# Patient Record
Sex: FEMALE | Race: WHITE | Employment: FULL TIME | URBAN - METROPOLITAN AREA
[De-identification: names, ages, dates, MRNs, and addresses within clinical notes are randomized per-mention and may not be internally consistent; named-entity substitution may affect disease eponyms.]

---

## 2017-09-26 PROCEDURE — 87510 GARDNER VAG DNA DIR PROBE: CPT | Performed by: INTERNAL MEDICINE

## 2017-09-26 PROCEDURE — 87491 CHLMYD TRACH DNA AMP PROBE: CPT | Performed by: INTERNAL MEDICINE

## 2017-09-26 PROCEDURE — 87591 N.GONORRHOEAE DNA AMP PROB: CPT | Performed by: INTERNAL MEDICINE

## 2017-09-26 PROCEDURE — 87660 TRICHOMONAS VAGIN DIR PROBE: CPT | Performed by: INTERNAL MEDICINE

## 2017-09-26 PROCEDURE — 87624 HPV HI-RISK TYP POOLED RSLT: CPT | Performed by: INTERNAL MEDICINE

## 2017-09-26 PROCEDURE — 87480 CANDIDA DNA DIR PROBE: CPT | Performed by: INTERNAL MEDICINE

## 2017-09-26 PROCEDURE — 88175 CYTOPATH C/V AUTO FLUID REDO: CPT | Performed by: INTERNAL MEDICINE

## 2017-10-07 PROCEDURE — 81001 URINALYSIS AUTO W/SCOPE: CPT | Performed by: INTERNAL MEDICINE

## 2019-01-31 PROCEDURE — 81001 URINALYSIS AUTO W/SCOPE: CPT | Performed by: INTERNAL MEDICINE

## 2019-01-31 PROCEDURE — 87086 URINE CULTURE/COLONY COUNT: CPT | Performed by: INTERNAL MEDICINE

## 2020-11-02 ENCOUNTER — HOSPITAL ENCOUNTER (OUTPATIENT)
Age: 50
Discharge: HOME | End: 2020-11-02
Payer: COMMERCIAL

## 2020-11-02 VITALS
SYSTOLIC BLOOD PRESSURE: 136 MMHG | TEMPERATURE: 98 F | HEART RATE: 86 BPM | OXYGEN SATURATION: 99 % | RESPIRATION RATE: 18 BRPM | DIASTOLIC BLOOD PRESSURE: 84 MMHG

## 2020-11-02 DIAGNOSIS — Z20.822 ENCOUNTER FOR LABORATORY TESTING FOR COVID-19 VIRUS: Primary | ICD-10-CM

## 2020-11-02 PROCEDURE — U0003 INFECTIOUS AGENT DETECTION BY NUCLEIC ACID (DNA OR RNA); SEVERE ACUTE RESPIRATORY SYNDROME CORONAVIRUS 2 (SARS-COV-2) (CORONAVIRUS DISEASE [COVID-19]), AMPLIFIED PROBE TECHNIQUE, MAKING USE OF HIGH THROUGHPUT TECHNOLOGIES AS DESCRIBED BY CMS-2020-01-R: HCPCS | Performed by: EMERGENCY MEDICINE

## 2020-11-02 PROCEDURE — 99213 OFFICE O/P EST LOW 20 MIN: CPT | Performed by: EMERGENCY MEDICINE

## 2020-11-02 NOTE — ED PROVIDER NOTES
Patient Seen in: [facility]      History   Patient presents with:  Testing    Stated Complaint: Covid testing    Zuri Dominguez is a 48year old  female here for covid testing after daughter and  had sx of sore throat.  Mild congesti Normocephalic. Right Ear: Tympanic membrane, ear canal and external ear normal.      Left Ear: Tympanic membrane, ear canal and external ear normal. There is no impacted cerumen. Nose: Nose normal. No rhinorrhea. Mouth/Throat:      Mouth:  Mu our current criteria to test for COVID-19. Instructions given on self-isolation/quarantine and anticipatory guidance. Rechecked patient. Updated patient  on all findings and plan, who verbalized understanding and agreement with the plan.  All questions

## 2021-01-05 ENCOUNTER — HOSPITAL ENCOUNTER (OUTPATIENT)
Age: 51
Discharge: HOME | End: 2021-01-05
Payer: COMMERCIAL

## 2021-01-05 VITALS
TEMPERATURE: 98 F | SYSTOLIC BLOOD PRESSURE: 124 MMHG | RESPIRATION RATE: 18 BRPM | HEART RATE: 77 BPM | OXYGEN SATURATION: 98 % | DIASTOLIC BLOOD PRESSURE: 82 MMHG

## 2021-01-05 DIAGNOSIS — Z11.52 ENCOUNTER FOR SCREENING FOR COVID-19: Primary | ICD-10-CM

## 2021-01-05 PROCEDURE — 99213 OFFICE O/P EST LOW 20 MIN: CPT | Performed by: NURSE PRACTITIONER

## 2021-01-05 NOTE — ED PROVIDER NOTES
Patient Seen in: [facility]      History   Patient presents with:  Testing    Stated Complaint: Covid testing    HPI/Subjective:   HPI    This is a well appearing 48year old female who presents for a covid exposure.   Patient states her neph normal.      Mouth/Throat:      Mouth: Mucous membranes are moist.      Pharynx: Oropharynx is clear. Uvula midline. Eyes:      General: Lids are normal.      Extraocular Movements: Extraocular movements intact.       Conjunctiva/sclera: Conjunctivae norm

## 2021-01-09 LAB — SARS-COV-2 BY PCR: NOT DETECTED

## 2021-01-24 ENCOUNTER — HOSPITAL ENCOUNTER (OUTPATIENT)
Age: 51
Discharge: HOME | End: 2021-01-24
Attending: PHYSICIAN ASSISTANT
Payer: COMMERCIAL

## 2021-01-24 VITALS
OXYGEN SATURATION: 99 % | RESPIRATION RATE: 18 BRPM | HEART RATE: 88 BPM | TEMPERATURE: 98 F | SYSTOLIC BLOOD PRESSURE: 132 MMHG | DIASTOLIC BLOOD PRESSURE: 88 MMHG

## 2021-01-24 DIAGNOSIS — J02.0 ACUTE STREPTOCOCCAL PHARYNGITIS: Primary | ICD-10-CM

## 2021-01-24 DIAGNOSIS — Z20.822 ENCOUNTER FOR LABORATORY TESTING FOR COVID-19 VIRUS: ICD-10-CM

## 2021-01-24 LAB
S PYO AG THROAT QL: POSITIVE
SARS-COV-2 RNA RESP QL NAA+PROBE: NOT DETECTED

## 2021-01-24 PROCEDURE — 87880 STREP A ASSAY W/OPTIC: CPT | Performed by: PHYSICIAN ASSISTANT

## 2021-01-24 PROCEDURE — 99202 OFFICE O/P NEW SF 15 MIN: CPT | Performed by: PHYSICIAN ASSISTANT

## 2021-01-24 RX ORDER — IBUPROFEN 600 MG/1
TABLET ORAL
Qty: 20 TABLET | Refills: 0 | Status: SHIPPED | OUTPATIENT
Start: 2021-01-24

## 2021-01-24 RX ORDER — AZITHROMYCIN 250 MG/1
TABLET, FILM COATED ORAL
Qty: 1 PACKAGE | Refills: 0 | Status: SHIPPED | OUTPATIENT
Start: 2021-01-24 | End: 2021-01-29

## 2021-01-24 NOTE — ED INITIAL ASSESSMENT (HPI)
Pt here with complains of sore throat and headache that has been going on for 3 days , pt also states she has had some congestion and fatigue as well

## 2021-01-24 NOTE — ED PROVIDER NOTES
Patient Seen in: [facility]    History   Patient presents with:  Sore Throat    Stated Complaint: HEAD ACH SORE THROAT    HPI      63-year-old female presents with chief complaint of sore throat. Onset 5 days ago.   Patient reports associate Onset   • Cancer Father         bladder Ca   • Heart Disorder Father         s/p CABG in 42's.   living age 70 in 2016   • Heart Disease Father    • Hypertension Father    • Other (Other) Mother         hypothyroid, RA   • Heart Disorder Maternal Grandmothe within normal size limits bilaterally. No tonsillar exudates. Uvula midline. No trismus. No drooling. TMs within normal limits bilaterally. Mucous membranes moist.  Neck: The neck is supple. There is no evidence of JVD. No meningeal signs.   Chest: doctor as instructed. The patient verbalized understanding of the discharge instructions and plan.     Disposition and Plan     Clinical Impression:  Acute streptococcal pharyngitis  (primary encounter diagnosis)  Encounter for laboratory testing for COVID-

## 2022-04-28 ENCOUNTER — HOSPITAL ENCOUNTER (EMERGENCY)
Facility: HOSPITAL | Age: 52
Discharge: HOME | End: 2022-04-28
Attending: EMERGENCY MEDICINE
Payer: COMMERCIAL

## 2022-04-28 VITALS
RESPIRATION RATE: 16 BRPM | WEIGHT: 157 LBS | BODY MASS INDEX: 28.89 KG/M2 | HEART RATE: 81 BPM | OXYGEN SATURATION: 100 % | SYSTOLIC BLOOD PRESSURE: 146 MMHG | HEIGHT: 62 IN | DIASTOLIC BLOOD PRESSURE: 89 MMHG | TEMPERATURE: 99 F

## 2022-04-28 DIAGNOSIS — S01.81XA FACIAL LACERATION, INITIAL ENCOUNTER: Primary | ICD-10-CM

## 2022-04-28 PROCEDURE — 90471 IMMUNIZATION ADMIN: CPT

## 2022-04-28 PROCEDURE — 12013 RPR F/E/E/N/L/M 2.6-5.0 CM: CPT

## 2022-04-28 PROCEDURE — 99283 EMERGENCY DEPT VISIT LOW MDM: CPT

## 2022-04-28 NOTE — ED INITIAL ASSESSMENT (HPI)
Hit in the face with a soccer ball around 1320. Denies LOC, N/V.     +Headache. Laceration to bridge of nose.

## 2022-09-15 NOTE — PROGRESS NOTES
Called patient for her scheduled telephone colon screening. Per protocol scheduled pt for OV due to active sxs. Date, time, provider and location discussed over the phone.

## 2022-10-12 NOTE — TELEPHONE ENCOUNTER
From: Nelson Henriquez  To: Lyla Ricketts MD  Sent: 10/12/2022 1:40 PM CDT  Subject: Carollysimran Sic afternoon, I know at my appointment we took lexipro off my list of medications because I was not using it. Unfortunately since I started back at school in late august I have been in great need of it. I have been taking what was left over and I would like to revisit getting refills.   Thank you, Regino Lopes

## 2022-10-19 NOTE — TELEPHONE ENCOUNTER
Scheduled for: Colonoscopy 36999/EGD [address]  Provider Name: Dr Logan Jade  Date: Alli Terry 12/20/2022  [facility]   Sedation: MAC  Time:  11 am, (pt is aware that [facility] beds at the [facility] will call the day before to confirm arrival time)  Prep: split trilyte  Meds/Allergies Reconciled?: reviewed by provider   Diagnosis with codes:  crc screening Z12.11, gerd K21.9, throat pain R07.0  Was patient informed to call insurance with codes (Y/N): Yes   Referral sent?:  Yes  [facility] or [id number] notified?: Electronic case request was sent to [facility] via [employer]. Medication Orders: Pt is aware to NOT take iron pills, herbal meds and diet supplements for 7 days before exam. Also to NOT take any form of alcohol, recreational drugs and any forms of ED meds 24 hours before exam.     Misc Orders: Patient was informed that they will need a COVID 19 test prior to their procedure. Patient verbally understood & will await a phone call from [facility] to schedule. Further instructions given by staff:   Instructions given and pt verbalized understanding

## 2022-11-28 NOTE — ED INITIAL ASSESSMENT (HPI)
Pt states something is going through he house. Pt states having a cough and fever. Pt states having a HA. Pt states cough is very mild.

## 2022-12-21 NOTE — TELEPHONE ENCOUNTER
3 Year colonoscopy recall entered. Health maintenance updated. Colonoscopy done on 12/20/22 and next due on 12/20/25.

## 2022-12-21 NOTE — TELEPHONE ENCOUNTER
----- Message from Lilibeth Hall MD sent at 12/21/2022 12:05 PM CST -----  GI staff: please place recall for colonoscopy in 3 years

## 2023-01-25 NOTE — PATIENT INSTRUCTIONS
Obtain preoperative testing. Plan outpatient hemorrhoidectomy at [facility].    Same-day surgery will call the day before with instructions.   Avoid aspirin and NSAIDs for 5 days prior to surgery

## 2023-02-13 NOTE — ED INITIAL ASSESSMENT (HPI)
Pt here with sore throat since friday and noticed yesterday that uvula is slightly swollen and painful to swallow. denies any respiratory distress.

## 2023-03-16 NOTE — INTERVAL H&P NOTE
Pre-op Diagnosis: Hemorrhoids, unspecified hemorrhoid type [K64.9]    The above referenced H&P was reviewed by Pallavi Gomes MD on 3/16/2023, the patient was examined and no significant changes have occurred in the patient's condition since the H&P was performed. I discussed with the patient and/or legal representative the potential benefits, risks and side effects of this procedure; the likelihood of the patient achieving goals; and potential problems that might occur during recuperation. I discussed reasonable alternatives to the procedure, including risks, benefits and side effects related to the alternatives and risks related to not receiving this procedure. We will proceed with procedure as planned.

## 2023-03-16 NOTE — ANESTHESIA PROCEDURE NOTES
Airway  Date/Time: 3/16/2023 12:48 PM  Urgency: Elective    Airway not difficult    General Information and Staff    Patient location during procedure: OR  Anesthesiologist: Shelly Lynch MD  Performed: anesthesiologist   Performed by: Shelly Lynch MD  Authorized by: Shelly Lynch MD      Indications and Patient Condition  Indications for airway management: anesthesia  Spontaneous Ventilation: absent  Sedation level: deep  Preoxygenated: yes  Patient position: sniffing  Mask difficulty assessment: 1 - vent by mask  Planned trial extubation    Final Airway Details  Final airway type: endotracheal airway      Successful airway: ETT  Cuffed: yes   Successful intubation technique: direct laryngoscopy  Facilitating devices/methods: cricoid pressure and intubating stylet  Endotracheal tube insertion site: oral  Blade: Klaudia  Blade size: #3  ETT size (mm): 7.0    Cormack-Lehane Classification: grade I - full view of glottis  Placement verified by: chest auscultation and capnometry   Measured from: lips  ETT to lips (cm): 22  Number of attempts at approach: 1  Number of other approaches attempted: 0    Additional Comments  Intubated easily first attempt. No dental or soft tissue damage.  No signs of aspiration

## 2023-03-17 NOTE — OPERATIVE REPORT
[facility]    PATIENT'S NAME: JOSELIN Best   ATTENDING PHYSICIAN: Adrianna Ruano MD   OPERATING PHYSICIAN: Adrianna Ruano MD   PATIENT ACCOUNT#:   [de-identified]    LOCATION:  [address]  MEDICAL RECORD #:   [id number]       YOB: 1970  ADMISSION DATE:       03/16/2023      OPERATION DATE:  03/16/2023    OPERATIVE REPORT    PREOPERATIVE DIAGNOSIS:  Complex hemorrhoids. POSTOPERATIVE DIAGNOSIS:  Complex hemorrhoids. PROCEDURE:  Complex hemorrhoidectomy x1, excision thrombosed hemorrhoid x1. ASSISTANT:  TREVOR Madsen. ESTIMATED BLOOD LOSS:  2 mL. COMPLICATIONS:  None. ANESTHESIA:  General.    DISPOSITION:  To recovery, tolerated well. INDICATIONS:  The patient is a pleasant 54-year-old with the above complaint. Consent obtained. OPERATIVE TECHNIQUE:  She was taken to surgery, placed in the prone position, prepped and draped in the usual sterile fashion. Prolapse test is performed. There is a large fibroepithelial lesion most likely from a chronic prolapsed hemorrhoid at 12 o'clock. A complex hemorrhoidectomy is performed using a 15 blade scalpel. Hemorrhoid dissected from the sphincter mechanism. Hemorrhoidectomy completed using Harmonic Scalpel. Mucosa approximated using running 2-0 chromic as is the anoderm. Marcaine infiltrated for local block. Just adjacent to this is a small thrombosed hemorrhoid, which is excised using LigaSure. No suturing is required. Sphincter block performed with Marcaine. Dibucaine, Gelfoam were placed and sterile dressing applied. She tolerated this well.      Dictated By Adrianna Ruano MD  d: 03/16/2023 13:17:55  t: 03/16/2023 16:45:57  Job [id number]  /    cc: Saadia Zamora MD

## 2023-03-20 NOTE — TELEPHONE ENCOUNTER
Per pt needs a return to work note with return date 4/3. Per pt asking to please upload into [address]  Thank you

## 2023-03-20 NOTE — TELEPHONE ENCOUNTER
RC at 3-. Patient request a letter to [employer].  MD ANITA  APPROVED. Letter written and emailed to patient. Patient informed.    BRANDON

## 2023-04-04 NOTE — PROGRESS NOTES
Postoperative Patient Follow-up      4/4/2023    HPI  Patient presents with:  Post-Op: PO hemorrhoidectomy 3/16/23      Toyin Echeverria is a 46year old female post op  Hemorrhoidectomy.     Exam  Normal post op changes      Assessment/Plan  Assessment   Post-operative state  (primary encounter diagnosis)    [facility]  Fu for problems         Anderson Daly MD

## 2023-07-05 NOTE — H&P
HPI:    Kenneth House is a 46year old female presents to clinic with menopausal concerns. Last menstrual cycle was about 2 years back. Since then has suffered from hot flashes, vaginal dryness, weight gain. Would like to discuss hormone replacement therapy  HISTORY:  Past Medical History:   Diagnosis Date    Ankle fracture     R    ANXIETY     Back problem     DEPRESSION     Depression     Esophageal reflux     HEADACHES     sinus    HEMORRHOIDS     HYPERLIPIDEMIA     mild    Osteoarthritis     OTHER DISEASES     fx'd 5th finger    Raynaud disease     Rheumatoid arthritis (HCC)     Visual impairment     Glasses      Past Surgical History:   Procedure Laterality Date      4-8-08    x2    D & C  6-10-09    SAB/    OTHER SURGICAL HISTORY      lower lid lac repair L as child    TUBAL LIGATION        Family History   Problem Relation Age of Onset    Cancer Father         bladder Ca    Heart Disorder Father         s/p CABG in s.   living age 70 in 2016    Heart Disease Father     Hypertension Father     Other (Other) Mother         hypothyroid, RA    Thyroid disease Mother     Heart Disorder Maternal Grandmother         MI CABG    Other (Other) Maternal Grandmother     Heart Disorder Maternal Grandfather     Other (Other) Maternal Grandfather         CVA, alzheimers- dx approx age [de-identified].  d alziherimers    Cancer Paternal Grandmother         pancreatic Ca    Heart Disorder Paternal Grandfather         CAD    Other (Other) Brother         ADD    Pulmonary Disease Brother         asthma    Cancer Other         bladder    Other (Other) Other         No Fhx colon, breast or ovarian CA.  no Fhx melanoma      Social History:   Social History     Socioeconomic History    Marital status:    Tobacco Use    Smoking status: Never    Smokeless tobacco: Never   Vaping Use    Vaping Use: Never used   Substance and Sexual Activity    Alcohol use: Yes     Comment: socially, 1x/week    Drug use: Never    Sexual activity: Yes     Partners: Male     Birth control/protection: Tubal Ligation     Comment: , together since 2007   Social History Narrative    Subbing        2 and 2 step kids--5, 7, 13, 14        Diet: well balanced, enough veggies, combo    Exercise: 3 times per week    Sleep: ok, hard to stay asleep    Stress: high, not good support        Periods: regular    Last pap: 2014--normal    Mammo: 2014        Sees Mary Jones        Never had c-scope        2016:  Lives with , kids and step kids. Works as         Medications (Active prior to today's visit):  Current Outpatient Medications   Medication Sig Dispense Refill    Conj Estrog-Medroxyprogest Ace 0.3-1.5 MG Oral Tab Take 1 tablet by mouth daily. 90 tablet 0    escitalopram 20 MG Oral Tab Take 1 tablet (20 mg total) by mouth daily. 90 tablet 3    ibuprofen 600 MG Oral Tab Take 1 tablet (600 mg total) by mouth every 6 hours with food 20 tablet 0    Multiple Vitamin (DAILY MULTIVITAMIN OR) Take  by mouth. HYDROcodone-acetaminophen 5-325 MG Oral Tab Take 1 tablet by mouth every 6 (six) hours as needed for Pain. (Patient not taking: Reported on 7/5/2023) 15 tablet 0    docusate sodium 100 MG Oral Cap Take 1 capsule (100 mg total) by mouth 2 (two) times daily. (Patient not taking: Reported on 7/5/2023) 20 capsule 1    acetaminophen 500 MG Oral Tab Take 1 tablet (500 mg total) by mouth as needed for Pain. (Patient not taking: Reported on 7/5/2023)      FLUTICASONE PROPIONATE 50 MCG/ACT Nasal Suspension SPRAY 2 PUFFS IN EACH NOSTRIL DAILY (Patient not taking: Reported on 7/5/2023) 48 g 0       Allergies:    Amoxicillin             RASH      Depression Screening (PHQ-2/PHQ-9): Over the LAST 2 WEEKS                         ROS:   Review of Systems   All other systems reviewed and are negative.       PHYSICAL EXAM:      07/05/23  1254   BP: 114/79   BP Location: Right arm   Patient Position: Sitting   Cuff Size: large   Pulse: 67   Resp: 18   SpO2: 98%   Weight: 161 lb (73 kg)   Height: 5' 2\" (1.575 m)     Physical Exam  Constitutional:       General: She is not in acute distress. Cardiovascular:      Rate and Rhythm: Normal rate. Pulmonary:      Effort: No respiratory distress. Neurological:      Mental Status: She is alert. Mental status is at baseline. Psychiatric:         Mood and Affect: Mood normal.         ASSESSMENT/PLAN:   (Z78.0) Menopause  (primary encounter diagnosis)  (R23.2) Hot flashes  (N89.8) Vaginal dryness  Plan:   - options for treatment discussed. Patient would like to try HRT. Counseled on adverse effects including increased risk of blood clots, strokes, breast cancer, endometrial cancer, elevated blood pressure. Balanced diet, regular physical activity encouraged. Prempro to pharmacy. Side effects/instructions discussed follow-up in 6 to 8 weeks or sooner if needed.    (Z12.31) Visit for screening mammogram  Plan: [facility] 2D+3D SCREENING BILAT         (CPT=77067/91127)         Responsible party/patient verbalized understanding of information discussed. No barriers to learning observed. Orders This Visit:  No orders of the defined types were placed in this encounter. Meds This Visit:  Requested Prescriptions     Signed Prescriptions Disp Refills    Conj Estrog-Medroxyprogest Ace 0.3-1.5 MG Oral Tab 90 tablet 0     Sig: Take 1 tablet by mouth daily. Imaging & Referrals:  [facility] 2D+3D SCREENING BILAT (CPT=77067/60230)       The 21st Century cures Act makes medical notes like these available to patients in the interest of transparency. However, be advised that this is a medical document. It is intended as peer to peer communication. It is written in medical language and may contain abbreviations or verbiage that are unfamiliar. It may appear blunt or direct.   Medical documents are intended to carry relevant information, facts as evident, and the clinical opinion of the practitioner. This note was created by [employer] voice recognition. Errors in content may be related to improper recognition by the system; efforts to review and correct have been done but errors may still exist. Please contact me with any questions.        7/5/2023  Daria Olivarez MD

## 2023-07-27 NOTE — TELEPHONE ENCOUNTER
From: Marietta Diaz  To: Daria Olivarez MD  Sent: 7/27/2023 9:26 AM CDT  Subject: hormone update    It's been three weeks. I don't really feel any different. Still getting hot flashes. The dryness is maybe a little better. BUT, I've gained 5 pounds with no change in my diet or exercise.

## 2024-10-15 NOTE — PROGRESS NOTES
HPI:    Toyin Barajas is a 54 year old female presents for video visit with concerns regarding abdominal pain. For the past several months, patient has felt that regardless of what she eats, she develops abdominal pain, bloating, acid reflux. Normal appetite, eats bland foods. Has cutout spicy/fried foods. Atleast 1 BM a day, normal urination.   Over the past 2 weeks, she feels that pain is localized to her right upper quadrant. Sharp, intermittent.       HISTORY:  Past Medical History:    Ankle fracture    R    ANXIETY    Back problem    DEPRESSION    Depression    Esophageal reflux    HEADACHES    sinus    HEMORRHOIDS    HYPERLIPIDEMIA    mild    Osteoarthritis    OTHER DISEASES    fx'd 5th finger    Raynaud disease    Rheumatoid arthritis (HCC)    Visual impairment    Glasses      Past Surgical History:   Procedure Laterality Date      4-8-08    x2    D & c  6-10-09    SAB/    Other surgical history      lower lid lac repair L as child    Tubal ligation        Family History   Problem Relation Age of Onset    Other (Other) Mother         hypothyroid, RA    Thyroid disease Mother     Cancer Father         bladder Ca    Heart Disorder Father         s/p CABG in 40's.  living age 71 in 2016    Heart Disease Father     Hypertension Father     Other (Other) Brother         ADD    Pulmonary Disease Brother         asthma    Heart Disorder Maternal Grandmother         MI CABG    Other (Other) Maternal Grandmother     Heart Disorder Maternal Grandfather     Other (Other) Maternal Grandfather         CVA, alzheimers- dx approx age 80.  d alziherimers    Pancreatic Cancer Paternal Grandmother 78    Cancer Paternal Grandmother         pancreatic Ca    Heart Disorder Paternal Grandfather         CAD    Cancer Other         bladder    Other (Other) Other         No Fhx colon, breast or ovarian CA.  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  Works as         Medications (Active prior to today's visit):  Current Outpatient Medications   Medication Sig Dispense Refill    estradiol 0.1 MG/GM Vaginal Cream Please administer 2g daily for 1 week, then 1g daily for the next week and then 1g 3 times a week thereafter 1 each 3    escitalopram 20 MG Oral Tab Take 1 tablet (20 mg total) by mouth daily. 90 tablet 3    HYDROcodone-acetaminophen 5-325 MG Oral Tab Take 1 tablet by mouth every 6 (six) hours as needed for Pain. (Patient not taking: Reported on 7/5/2023) 15 tablet 0    docusate sodium 100 MG Oral Cap Take 1 capsule (100 mg total) by mouth 2 (two) times daily. (Patient not taking: Reported on 7/5/2023) 20 capsule 1    acetaminophen 500 MG Oral Tab Take 1 tablet (500 mg total) by mouth as needed for Pain. (Patient not taking: Reported on 7/5/2023)      ibuprofen 600 MG Oral Tab Take 1 tablet (600 mg total) by mouth every 6 hours with food 20 tablet 0    FLUTICASONE PROPIONATE 50 MCG/ACT Nasal Suspension SPRAY 2 PUFFS IN EACH NOSTRIL DAILY (Patient not taking: Reported on 7/5/2023) 48 g 0    Multiple Vitamin (DAILY MULTIVITAMIN OR) Take  by mouth.         Allergies:  Allergies[1]      Depression Screening  (PHQ-2/PHQ-9): Over the LAST 2 WEEKS                         ROS:   Review of Systems   All other systems reviewed and are negative.      PHYSICAL EXAM:   There were no vitals filed for this visit.  Physical Exam  Constitutional:       General: She is not in acute distress.  Pulmonary:      Effort: Pulmonary effort is normal. No respiratory distress.   Neurological:      Mental Status: She is alert.   Psychiatric:         Mood and Affect: Mood normal.         ASSESSMENT/PLAN:   (R10.84) Generalized postprandial abdominal pain  (primary encounter diagnosis)  (R14.0) Bloating  (R10.11) RUQ pain  Plan:   Advised diet/lifestyle changes. To avoid the following: carbonated beverages, spicy foods, acidic fruits/vegetables, excessive caffeine/alcohol intake. No tobacco use.  Also advised against laying flat for at least 3 hours after eating.  Ok to take Pepcid as needed  Hpylori test ordered. US abdomen ordered to rule out gallstones. Will await results.     I conducted a telehealth visit with the above named patient, which was completed using two-way, real-time interactive audio and video communication. This has been done in good donavon to provide continuity of care in the best interest of the provider-patient relationship, due to the COVID - public health crisis/national emergency where restrictions of face-to-face office visits are ongoing. Every conscious effort was taken to allow for sufficient and adequate time to complete the visit.  The patient was made aware of the limitations of the telehealth visit, including treatment limitations as no physical exam could be performed.  The patient was advised to call 911 or to go to the ER in case there was an emergency.  The patient was also advised of the potential privacy & security concerns related to the telehealth platform.   The patient was made aware of where to find [facility]'s notice of privacy practices, telehealth consent form and other related consent forms and  documents.  which are located on the [facility] website. The patient verbally agreed to telehealth consent form, related consents and the risks discussed.    Lastly, the patient confirmed that they were in [place].   Included in this visit, time may have been spent reviewing labs, medications, radiology tests and decision making. Appropriate medical decision-making and tests are ordered as detailed in the plan of care above.  Coding/billing information is submitted for this visit based on complexity of care and/or time spent for the visit.                 Responsible party/patient verbalized understanding of information discussed. No barriers to learning observed.            Orders This Visit:  Orders Placed This Encounter   Procedures    Helicobacter Pylori Breath Test, Adult       Meds This Visit:  Requested Prescriptions      No prescriptions requested or ordered in this encounter       Imaging & Referrals:  US ABDOMEN COMPLETE (CPT=76700)     Chaperone offered at visit today.     The 21st Century cures Act makes medical notes like these available to patients in the interest of transparency.  However, be advised that this is a medical document.  It is intended as peer to peer communication.  It is written in medical language and may contain abbreviations or verbiage that are unfamiliar.  It may appear blunt or direct.  Medical documents are intended to carry relevant information, facts as evident, and the clinical opinion of the practitioner.      This note was created by [employer] voice recognition. Errors in content may be related to improper recognition by the system; efforts to review and correct have been done but errors may still exist. Please contact me with any questions.       10/15/2024  Pelon Gibbons MD       [1]   Allergies  Allergen Reactions    Amoxicillin RASH

## 2024-10-18 NOTE — TELEPHONE ENCOUNTER
REFILL PASSED PER [facility] PROTOCOLS     Please review pended refill request as unable to refill due to high/very high drug interaction warning copied here;        []Show filtered (1)  High  Drug-Drug: ibuprofen and escitalopramToxic effects may be increased with concurrent administration of ibuprofen and Selective Serotonin Reuptake Inhibitors. The risk of upper gastrointestinal bleeding may be increased. Patients taking both drugs concurrently should be educated about the signs and symptoms of GI bleeding.  Details         Requested Prescriptions   Pending Prescriptions Disp Refills    ESCITALOPRAM 20 MG Oral Tab [Pharmacy Med Name: ESCITALOPRAM 20MG TABLETS] 90 tablet 3     Sig: TAKE 1 TABLET(20 MG) BY MOUTH DAILY       Psychiatric Non-Scheduled (Anti-Anxiety) Passed - 10/18/2024  4:08 PM        Passed - In person appointment or virtual visit in the past 6 mos or appointment in next 3 mos     Recent Outpatient Visits              3 days ago Generalized postprandial abdominal pain    [facility] Pelon Gibbons MD    Telemedicine    11 months ago Fatigue, unspecified type    [facility] Aditi Thomas MD    Office Visit    11 months ago FELIPE (generalized anxiety disorder)    [facility] Pelon Gibbons MD    Office Visit    1 year ago Menopause    [facility] Pelon Gibbons MD    Office Visit    1 year ago Post-operative state    [facility] Sundeep Aviles MD    Office Visit          Future Appointments         Provider Department Appt Notes    In 5 days [address] Ultrasound [facility]     In 2 weeks [address] Mammography [facility]     In 1 month Pelon Gibbons MD [facility] Last px 08/08/2020                     Passed - Depression Screening completed within the past 12 months             Future Appointments         Provider Department Appt Notes    In 5 days [address] Ultrasound - [place]     In 2 weeks [address] Mammography - [place]     In 1 month Pelon Gibbons MD [facility] Last px 08/08/2020          Recent Outpatient Visits              3 days ago Generalized postprandial abdominal pain    [facility] Pelon Gibbons MD    Telemedicine    11 months ago Fatigue, unspecified type    [facility] Aditi Thomas MD    Office Visit    11 months ago FELIPE (generalized anxiety disorder)    [facility] Pelon Gibbons MD    Office Visit    1 year ago Menopause    [facility] Pelon Gibbons MD    Office Visit    1 year ago Post-operative state    [facility] Sundeep Aviles MD    Office Visit

## 2024-10-28 NOTE — TELEPHONE ENCOUNTER
Hi there, I have a patient with gall stones that needs help getting in. I placed a referral. 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     Thanks!

## 2024-11-05 NOTE — H&P (VIEW-ONLY)
[facility]    Patient Name:  Toyin Barajas   YOB: 1970   Gender:  Female   Appt Date:  11/5/2024   Provider:  Demetrio Spence MD   [facility]     PATIENT PROVIDERS  Referring Provider: No ref. provider found   Address: No referring provider defined for this encounter.   Phone #: N/A    Primary Care Provider:Pelon Gibbons MD   Address: [address] [place] [address]   Phone #: [phone]       CHIEF COMPLAINT  Chief Complaint   Patient presents with    Consult        PROBLEMS  Reviewed   Patient Active Problem List   Diagnosis    Family history of ischemic heart disease    Family history of thyroid disease    PMDD (premenstrual dysphoric disorder)    Xerosis cutis    Skin tag    Closed fracture of lateral malleolus    Raynaud's phenomenon without gangrene    Raynaud disease    Centromere antibody positive    Fatigue    Class 1 obesity due to excess calories without serious comorbidity with body mass index (BMI) of 32.0 to 32.9 in adult    Post-menopausal    Vaginal dryness        History of Present Illness:  Asked to evaluate the patient and render opinion on surgical management of gallstones.  Very pleasant 54-year-old female who is being evaluated today for the above reason.  Patient has been experiencing upper abdominal [left-sided and right-sided] pain for the past few months.  She notes that this pain is oftentimes brought about after ingesting meals and may sometimes occur when she has not had anything to eat for several hours.  It lasted for about half an hour to 1 hour and subsides on its own.She underwent a workup which included ultrasound of the abdomen.  This revealed cholelithiasis without evidence of cholecystitis.  Bile duct within normal limits.  The patient does endorse weight loss of about 30 pounds over the past year.     Vital Signs:  BP (!) 127/94 (BP Location: Left arm, Patient Position:  Sitting, Cuff Size: adult)   Pulse 67   Temp 97.2 °F (36.2 °C) (Temporal)   Resp 20   Wt 67.9 kg (149 lb 12.8 oz)   SpO2 100%   BMI 27.40 kg/m²      Medications Reviewed:    Current Outpatient Medications:     escitalopram 20 MG Oral Tab, Take 1 tablet (20 mg total) by mouth daily., Disp: 90 tablet, Rfl: 0    estradiol 0.1 MG/GM Vaginal Cream, Please administer 2g daily for 1 week, then 1g daily for the next week and then 1g 3 times a week thereafter, Disp: 1 each, Rfl: 3    HYDROcodone-acetaminophen 5-325 MG Oral Tab, Take 1 tablet by mouth every 6 (six) hours as needed for Pain., Disp: 15 tablet, Rfl: 0    docusate sodium 100 MG Oral Cap, Take 1 capsule (100 mg total) by mouth 2 (two) times daily., Disp: 20 capsule, Rfl: 1    acetaminophen 500 MG Oral Tab, Take 1 tablet (500 mg total) by mouth as needed for Pain., Disp: , Rfl:     ibuprofen 600 MG Oral Tab, Take 1 tablet (600 mg total) by mouth every 6 hours with food, Disp: 20 tablet, Rfl: 0    FLUTICASONE PROPIONATE 50 MCG/ACT Nasal Suspension, SPRAY 2 PUFFS IN EACH NOSTRIL DAILY, Disp: 48 g, Rfl: 0    Multiple Vitamin (DAILY MULTIVITAMIN OR), Take  by mouth., Disp: , Rfl:      Allergies Reviewed:  Allergies[1]     History:  Reviewed:  Past Medical History:    Ankle fracture    R    ANXIETY    Back problem    DEPRESSION    Depression    Esophageal reflux    HEADACHES    sinus    HEMORRHOIDS    HYPERLIPIDEMIA    mild    Osteoarthritis    OTHER DISEASES    fx'd 5th finger    Raynaud disease    Rheumatoid arthritis (HCC)    Visual impairment    Glasses      Reviewed:  Past Surgical History:   Procedure Laterality Date      4-8-08    x2    D & c  6-10-09    SAB/    Other surgical history      lower lid lac repair L as child    Tubal ligation        Reviewed Social History:  Social History     Socioeconomic History    Marital status:    Tobacco Use    Smoking status: Never    Smokeless tobacco: Never   Vaping Use    Vaping status: Never Used    Substance and Sexual Activity    Alcohol use: Yes     Comment: socially, 1x/week    Drug use: Never    Sexual activity: Yes     Partners: Male     Birth control/protection: Tubal Ligation     Comment: , together since 2007      Reviewed:  Family History   Problem Relation Age of Onset    Other (Other) Mother         hypothyroid, RA    Thyroid disease Mother     Cancer Father         bladder Ca    Heart Disorder Father         s/p CABG in 40's.  living age 71 in 2016    Heart Disease Father     Hypertension Father     Other (Other) Brother         ADD    Pulmonary Disease Brother         asthma    Heart Disorder Maternal Grandmother         MI CABG    Other (Other) Maternal Grandmother     Heart Disorder Maternal Grandfather     Other (Other) Maternal Grandfather         CVA, alzheimers- dx approx age 80.  d alziherimers    Pancreatic Cancer Paternal Grandmother 78    Cancer Paternal Grandmother         pancreatic Ca    Heart Disorder Paternal Grandfather         CAD    Cancer Other         bladder    Other (Other) Other         No Fhx colon, breast or ovarian CA.  no Fhx melanoma        Review of Systems:  Review of Systems   Constitutional:  Negative for activity change, appetite change, chills, fatigue, fever and unexpected weight change.   HENT:  Negative for congestion and trouble swallowing.    Eyes:  Negative for discharge and redness.   Respiratory:  Negative for cough, chest tightness and shortness of breath.    Cardiovascular:  Negative for chest pain and leg swelling.   Gastrointestinal:  Negative for abdominal distention, abdominal pain and nausea.   Endocrine: Negative for polydipsia and polyuria.   Genitourinary:  Negative for difficulty urinating and dysuria.   Musculoskeletal:  Negative for myalgias.   Skin:  Negative for color change and pallor.   Allergic/Immunologic: Negative for immunocompromised state.   Neurological:  Negative for syncope and weakness.   Hematological:  Does not  bruise/bleed easily.   Psychiatric/Behavioral:  Negative for agitation and confusion.         Physical Examination:  Physical Exam  Constitutional:       Appearance: She is well-developed.   HENT:      Head: Normocephalic.   Eyes:      Pupils: Pupils are equal, round, and reactive to light.   Cardiovascular:      Rate and Rhythm: Normal rate and regular rhythm.      Heart sounds: No murmur heard.  Pulmonary:      Effort: Pulmonary effort is normal. No respiratory distress.      Breath sounds: Normal breath sounds. No wheezing or rales.   Abdominal:      General: There is no distension.      Palpations: Abdomen is soft.      Tenderness: There is no abdominal tenderness.   Musculoskeletal:      Cervical back: Normal range of motion.   Skin:     General: Skin is warm and dry.   Neurological:      Mental Status: She is alert and oriented to person, place, and time.        PROCEDURE: US ABDOMEN COMPLETE (CPT=76700)     COMPARISON: None available.     INDICATIONS: Right upper quadrant abdominal pain. Bloating. Generalized postprandial abdominal pain.     TECHNIQUE:   The abdomen was evaluated with grayscale and colorflow of the main vessels.       FINDINGS:  LIVER:   Normal in size and parenchymal echogenicity with homogeneous echotexture. The hepatic contours are smooth. No focal masses are identified. There is no intrahepatic biliary ductal dilatation.    GALLBLADDER/CBD: Multiple (approximately 4) echogenic, shadowing calculi are visible. There is no significant biliary sludge. No gallbladder wall thickening, pericholecystic fluid, or intraluminal dilatation is evident. The common bile duct is normal in  caliber, measuring 0.2 cm.  PANCREAS: Visualized portions of the pancreatic head and body have a grossly unremarkable sonographic appearance. Views of the tail are obscured by intervening bowel gas.  SPLEEN: Homogenous echotexture without enlargement. The spleen measures 7.9 cm.  KIDNEYS: The right kidney measures 11.1  cm. The left kidney measures 10.4 cm. There is no evidence of hydronephrosis. No echogenic, shadowing calculus is visualized.  AORTA/VASCULAR: Visualized portions of the aorta and IVC are patent. The aorta is normal in caliber, measuring 2.4 cm proximally and 1.6 cm distally, without evidence of aneurysmal dilatation on survey imaging.    Patency and normal hepatopetal flow directionality of the main portal vein is demonstrated with velocity recorded as 28 cm/s.  OTHER: Negative. No ascites or adenopathy seen.                 Impression   CONCLUSION:  1. Cholelithiasis without sonographic evidence acute cholecystitis.     2. Negative for hepatobiliary dilatation.     3. Lesser incidental findings as above.           elm-remote.        Dictated by (CST): Daniele Boyd MD on 10/23/2024 at 8:01 AM      Finalized by (CST): Daniele Boyd MD on 10/23/2024 at 8:03 AM         Assessment / Plan:  Cholelithiasis, abdominal pain  Discussed with patient and  that her symptoms are likely related to biliary colic.  Recommend robotic possible open cholecystectomy  Counseled about risks associate with the procedure including not limited to bleeding, infection, bile leak  Patient agreeable wishes to proceed.    Demetrio Spence MD  [facility] Surgical Oncology  [facility]  [email or web address]       Follow Up:  No follow-ups on file.       Electronically Signed by: Demetrio Spence MD        [1]   Allergies  Allergen Reactions    Amoxicillin RASH

## 2024-11-05 NOTE — PATIENT INSTRUCTIONS
Surgery: Xi Robot-assisted, laparoscopic, possible open Cholecystectomy    Date of Surgery: 2024    Surgery Length: 1 hour    Anesthesia: []Local   []MAC  [x]General    Hospital:    [facility]- [address], [place] [address]   Phone: [phone]. Pre-Admission [phone]    This is an outpatient procedure.  Use the provided Chlorhexadine surgical soap(instructions attached) to shower the night before and morning of your procedure.  Do not apply powders, creams, lotions or deodorant after showering.  Do not apply any kind of makeup and make sure to remove nail polish prior to your surgery.  For faster recovery from anesthesia and surgery please follow the instructions below regarding your pre-op diet:  12 hours prior to your surgery time you are to drink one 10oz bottle of Ensure Pre-Surgery Drink. You are to have NO solid food or water after 11pm the night before your surgery EXCEPT one additional 10oz bottle of Ensure Pre-Surgery Drink. You need to finish drinking this 4 hours prior to surgery time.  Bring your picture ID and insurance card with you.  Wear comfortable clothing that can easily be removed. Preferably, something that zips, snaps, or buttons up the front.   You will be contacted by the hospital the day prior to your surgery to confirm details and give you specific instructions about when and where to arrive the day of your procedure.   If you are taking blood thinners including: Plavix, Eliquis, Coumadin you will need to contact the prescribing provider for specific instructions on holding these medications for your procedure  Motrin, Advil, Ibuprofen, Aspirin, Baby Aspirin and Fish Oil are also blood thinners and need to be held at least one week prior to your procedure. It is okay to take Tylenol.  Inform your primary care physician of your surgery and ask if him/her will need to see you prior to surgery.      Pre-Operative Testing  x CBC x CMP  BMP    PT, PTT,  INR  UA x EKG    Chest X-Ray  Cardiac Clearance x H & P Medical Clearance     Does patient have pacemaker: [] YES [x] No Does patient have TRINI:  [] YES [x] No  Is patient diabetic?  [] YES    [x] NO    Please call PCP/specialty physician to schedule pre-op exam for medical clearance needed 7 days prior to surgery.     For Dr. Spence's office: [phone]/ Fax: [phone]  After hours you will reach the answering service    Dr. Spence's nurse; Stacy RN:  [phone]  Monday through Friday 8:30 am to 4:30 pm     Central [phone] [place] [phone] [place]  Medical Records:   [phone]

## 2024-11-05 NOTE — CONSULTS
[facility]    Patient Name:  Toyin Barajas   YOB: 1970   Gender:  Female   Appt Date:  11/5/2024   Provider:  Demetrio Spence MD   [facility]     PATIENT PROVIDERS  Referring Provider: No ref. provider found   Address: No referring provider defined for this encounter.   Phone #: N/A    Primary Care Provider:Pelon Gibbons MD   Address: [address] [place] [address]   Phone #: [phone]       CHIEF COMPLAINT  Chief Complaint   Patient presents with    Consult        PROBLEMS  Reviewed   Patient Active Problem List   Diagnosis    Family history of ischemic heart disease    Family history of thyroid disease    PMDD (premenstrual dysphoric disorder)    Xerosis cutis    Skin tag    Closed fracture of lateral malleolus    Raynaud's phenomenon without gangrene    Raynaud disease    Centromere antibody positive    Fatigue    Class 1 obesity due to excess calories without serious comorbidity with body mass index (BMI) of 32.0 to 32.9 in adult    Post-menopausal    Vaginal dryness        History of Present Illness:  Asked to evaluate the patient and render opinion on surgical management of gallstones.  Very pleasant 54-year-old female who is being evaluated today for the above reason.  Patient has been experiencing upper abdominal [left-sided and right-sided] pain for the past few months.  She notes that this pain is oftentimes brought about after ingesting meals and may sometimes occur when she has not had anything to eat for several hours.  It lasted for about half an hour to 1 hour and subsides on its own.She underwent a workup which included ultrasound of the abdomen.  This revealed cholelithiasis without evidence of cholecystitis.  Bile duct within normal limits.  The patient does endorse weight loss of about 30 pounds over the past year.     Vital Signs:  BP (!) 127/94 (BP Location: Left arm, Patient Position:  Sitting, Cuff Size: adult)   Pulse 67   Temp 97.2 °F (36.2 °C) (Temporal)   Resp 20   Wt 67.9 kg (149 lb 12.8 oz)   SpO2 100%   BMI 27.40 kg/m²      Medications Reviewed:    Current Outpatient Medications:     escitalopram 20 MG Oral Tab, Take 1 tablet (20 mg total) by mouth daily., Disp: 90 tablet, Rfl: 0    estradiol 0.1 MG/GM Vaginal Cream, Please administer 2g daily for 1 week, then 1g daily for the next week and then 1g 3 times a week thereafter, Disp: 1 each, Rfl: 3    HYDROcodone-acetaminophen 5-325 MG Oral Tab, Take 1 tablet by mouth every 6 (six) hours as needed for Pain., Disp: 15 tablet, Rfl: 0    docusate sodium 100 MG Oral Cap, Take 1 capsule (100 mg total) by mouth 2 (two) times daily., Disp: 20 capsule, Rfl: 1    acetaminophen 500 MG Oral Tab, Take 1 tablet (500 mg total) by mouth as needed for Pain., Disp: , Rfl:     ibuprofen 600 MG Oral Tab, Take 1 tablet (600 mg total) by mouth every 6 hours with food, Disp: 20 tablet, Rfl: 0    FLUTICASONE PROPIONATE 50 MCG/ACT Nasal Suspension, SPRAY 2 PUFFS IN EACH NOSTRIL DAILY, Disp: 48 g, Rfl: 0    Multiple Vitamin (DAILY MULTIVITAMIN OR), Take  by mouth., Disp: , Rfl:      Allergies Reviewed:  Allergies[1]     History:  Reviewed:  Past Medical History:    Ankle fracture    R    ANXIETY    Back problem    DEPRESSION    Depression    Esophageal reflux    HEADACHES    sinus    HEMORRHOIDS    HYPERLIPIDEMIA    mild    Osteoarthritis    OTHER DISEASES    fx'd 5th finger    Raynaud disease    Rheumatoid arthritis (HCC)    Visual impairment    Glasses      Reviewed:  Past Surgical History:   Procedure Laterality Date      4-8-08    x2    D & c  6-10-09    SAB/    Other surgical history      lower lid lac repair L as child    Tubal ligation        Reviewed Social History:  Social History     Socioeconomic History    Marital status:    Tobacco Use    Smoking status: Never    Smokeless tobacco: Never   Vaping Use    Vaping status: Never Used    Substance and Sexual Activity    Alcohol use: Yes     Comment: socially, 1x/week    Drug use: Never    Sexual activity: Yes     Partners: Male     Birth control/protection: Tubal Ligation     Comment: , together since 2007      Reviewed:  Family History   Problem Relation Age of Onset    Other (Other) Mother         hypothyroid, RA    Thyroid disease Mother     Cancer Father         bladder Ca    Heart Disorder Father         s/p CABG in 40's.  living age 71 in 2016    Heart Disease Father     Hypertension Father     Other (Other) Brother         ADD    Pulmonary Disease Brother         asthma    Heart Disorder Maternal Grandmother         MI CABG    Other (Other) Maternal Grandmother     Heart Disorder Maternal Grandfather     Other (Other) Maternal Grandfather         CVA, alzheimers- dx approx age 80.  d alziherimers    Pancreatic Cancer Paternal Grandmother 78    Cancer Paternal Grandmother         pancreatic Ca    Heart Disorder Paternal Grandfather         CAD    Cancer Other         bladder    Other (Other) Other         No Fhx colon, breast or ovarian CA.  no Fhx melanoma        Review of Systems:  Review of Systems   Constitutional:  Negative for activity change, appetite change, chills, fatigue, fever and unexpected weight change.   HENT:  Negative for congestion and trouble swallowing.    Eyes:  Negative for discharge and redness.   Respiratory:  Negative for cough, chest tightness and shortness of breath.    Cardiovascular:  Negative for chest pain and leg swelling.   Gastrointestinal:  Negative for abdominal distention, abdominal pain and nausea.   Endocrine: Negative for polydipsia and polyuria.   Genitourinary:  Negative for difficulty urinating and dysuria.   Musculoskeletal:  Negative for myalgias.   Skin:  Negative for color change and pallor.   Allergic/Immunologic: Negative for immunocompromised state.   Neurological:  Negative for syncope and weakness.   Hematological:  Does not  bruise/bleed easily.   Psychiatric/Behavioral:  Negative for agitation and confusion.         Physical Examination:  Physical Exam  Constitutional:       Appearance: She is well-developed.   HENT:      Head: Normocephalic.   Eyes:      Pupils: Pupils are equal, round, and reactive to light.   Cardiovascular:      Rate and Rhythm: Normal rate and regular rhythm.      Heart sounds: No murmur heard.  Pulmonary:      Effort: Pulmonary effort is normal. No respiratory distress.      Breath sounds: Normal breath sounds. No wheezing or rales.   Abdominal:      General: There is no distension.      Palpations: Abdomen is soft.      Tenderness: There is no abdominal tenderness.   Musculoskeletal:      Cervical back: Normal range of motion.   Skin:     General: Skin is warm and dry.   Neurological:      Mental Status: She is alert and oriented to person, place, and time.        PROCEDURE: US ABDOMEN COMPLETE (CPT=76700)     COMPARISON: None available.     INDICATIONS: Right upper quadrant abdominal pain. Bloating. Generalized postprandial abdominal pain.     TECHNIQUE:   The abdomen was evaluated with grayscale and colorflow of the main vessels.       FINDINGS:  LIVER:   Normal in size and parenchymal echogenicity with homogeneous echotexture. The hepatic contours are smooth. No focal masses are identified. There is no intrahepatic biliary ductal dilatation.    GALLBLADDER/CBD: Multiple (approximately 4) echogenic, shadowing calculi are visible. There is no significant biliary sludge. No gallbladder wall thickening, pericholecystic fluid, or intraluminal dilatation is evident. The common bile duct is normal in  caliber, measuring 0.2 cm.  PANCREAS: Visualized portions of the pancreatic head and body have a grossly unremarkable sonographic appearance. Views of the tail are obscured by intervening bowel gas.  SPLEEN: Homogenous echotexture without enlargement. The spleen measures 7.9 cm.  KIDNEYS: The right kidney measures 11.1  cm. The left kidney measures 10.4 cm. There is no evidence of hydronephrosis. No echogenic, shadowing calculus is visualized.  AORTA/VASCULAR: Visualized portions of the aorta and IVC are patent. The aorta is normal in caliber, measuring 2.4 cm proximally and 1.6 cm distally, without evidence of aneurysmal dilatation on survey imaging.    Patency and normal hepatopetal flow directionality of the main portal vein is demonstrated with velocity recorded as 28 cm/s.  OTHER: Negative. No ascites or adenopathy seen.                 Impression   CONCLUSION:  1. Cholelithiasis without sonographic evidence acute cholecystitis.     2. Negative for hepatobiliary dilatation.     3. Lesser incidental findings as above.           elm-remote.        Dictated by (CST): Daniele Boyd MD on 10/23/2024 at 8:01 AM      Finalized by (CST): Daniele Boyd MD on 10/23/2024 at 8:03 AM         Assessment / Plan:  Cholelithiasis, abdominal pain  Discussed with patient and  that her symptoms are likely related to biliary colic.  Recommend robotic possible open cholecystectomy  Counseled about risks associate with the procedure including not limited to bleeding, infection, bile leak  Patient agreeable wishes to proceed.    Demetrio Spence MD  [facility]  [facility] ([place], [place])  [email or web address]       Follow Up:  No follow-ups on file.       Electronically Signed by: Demetrio Spence MD        [1]   Allergies  Allergen Reactions    Amoxicillin RASH

## 2024-11-07 NOTE — TELEPHONE ENCOUNTER
Called patient to notify surgery Xi Robot-assisted, laparoscopic, possible open Cholecystectomy scheduled for 11/18/24 at [place]. No answer, LVM.

## 2024-11-07 NOTE — TELEPHONE ENCOUNTER
Notified patient of surgery Xi Robot-assisted, laparoscopic, possible open Cholecystectomy scheduled for 11/18/24 at [place]. Patient agreeable to surgery date and place. Advised patient to call back regarding any questions or concerns. Patient verbalized understanding.

## 2024-11-12 NOTE — H&P
HPI:    Toyin Barajas is a 54 year old female presents to clinic for preoperative exam.  She is undergoing a robotic assisted laparoscopic cholecystectomy on  with Dr. Spence at [facility].  Overall, doing well.  No acute concerns or major changes in symptoms.  Normal appetite.  Normal bowel movements and urination.  No change in sleep habits.      HISTORY:  Past Medical History:    Ankle fracture    R    ANXIETY    Back problem    DEPRESSION    Depression    Esophageal reflux    HEADACHES    sinus    HEMORRHOIDS    HYPERLIPIDEMIA    mild    Osteoarthritis    OTHER DISEASES    fx'd 5th finger    Raynaud disease    Rheumatoid arthritis (HCC)    Visual impairment    Glasses      Past Surgical History:   Procedure Laterality Date      4-8-08    x2    D & c  6-10-09    SAB/    Other surgical history      lower lid lac repair L as child    Tubal ligation        Family History   Problem Relation Age of Onset    Other (Other) Mother         hypothyroid, RA    Thyroid disease Mother     Cancer Father         bladder Ca    Heart Disorder Father         s/p CABG in 40s.  living age 71 in     Heart Disease Father     Hypertension Father     Other (Other) Brother         ADD    Pulmonary Disease Brother         asthma    Heart Disorder Maternal Grandmother         MI CABG    Other (Other) Maternal Grandmother     Heart Disorder Maternal Grandfather     Other (Other) Maternal Grandfather         CVA, alzheimers- dx approx age 80.  d alziherimers    Pancreatic Cancer Paternal Grandmother 78    Cancer Paternal Grandmother         pancreatic Ca    Heart Disorder Paternal Grandfather         CAD    Cancer Other         bladder    Other (Other) Other         No Fhx colon, breast or ovarian CA.  no Fhx melanoma      Social History:   Social History     Socioeconomic History    Marital status:    Tobacco Use    Smoking status: Never    Smokeless tobacco: Never   Vaping Use    Vaping status:  Never Used   Substance and Sexual Activity    Alcohol use: Yes     Comment: socially, 1x/week    Drug use: Never    Sexual activity: Yes     Partners: Male     Birth control/protection: Tubal Ligation     Comment: , together since 2007   Social History Narrative    Subbing        2 and 2 step kids--5, 7, 13, 14        Diet: well balanced, enough veggies, combo    Exercise: 3 times per week    Sleep: ok, hard to stay asleep    Stress: high, not good support        Periods: regular    Last pap: 2014--normal    Mammo: 2014        Sees [facility]        Never had c-scope        2016:  Lives with , kids and step kids.  Works as         Medications (Active prior to today's visit):  Current Outpatient Medications   Medication Sig Dispense Refill    escitalopram 20 MG Oral Tab Take 1 tablet (20 mg total) by mouth daily. 90 tablet 0    estradiol 0.1 MG/GM Vaginal Cream Please administer 2g daily for 1 week, then 1g daily for the next week and then 1g 3 times a week thereafter 1 each 3    HYDROcodone-acetaminophen 5-325 MG Oral Tab Take 1 tablet by mouth every 6 (six) hours as needed for Pain. 15 tablet 0    docusate sodium 100 MG Oral Cap Take 1 capsule (100 mg total) by mouth 2 (two) times daily. 20 capsule 1    acetaminophen 500 MG Oral Tab Take 1 tablet (500 mg total) by mouth as needed for Pain.      ibuprofen 600 MG Oral Tab Take 1 tablet (600 mg total) by mouth every 6 hours with food 20 tablet 0    FLUTICASONE PROPIONATE 50 MCG/ACT Nasal Suspension SPRAY 2 PUFFS IN EACH NOSTRIL DAILY 48 g 0    Multiple Vitamin (DAILY MULTIVITAMIN OR) Take  by mouth.         Allergies:  Allergies[1]      ROS:   Review of Systems   All other systems reviewed and are negative.      PHYSICAL EXAM:     Vitals:    11/12/24 1513   BP: 127/84   BP Location: Right arm   Patient Position: Sitting   Cuff Size: adult   Pulse: 71   Resp: 17   SpO2: 99%   Weight: 147 lb (66.7 kg)     Physical Exam  Vitals  reviewed.   Constitutional:       General: She is not in acute distress.  HENT:      Head: Normocephalic and atraumatic.      Right Ear: Tympanic membrane, ear canal and external ear normal.      Left Ear: Tympanic membrane, ear canal and external ear normal.      Nose: Nose normal.      Mouth/Throat:      Pharynx: Uvula midline.   Eyes:      Conjunctiva/sclera: Conjunctivae normal.      Pupils: Pupils are equal, round, and reactive to light.   Neck:      Thyroid: No thyromegaly.   Cardiovascular:      Rate and Rhythm: Normal rate and regular rhythm.      Heart sounds: Normal heart sounds. No murmur heard.  Pulmonary:      Effort: Pulmonary effort is normal. No respiratory distress.      Breath sounds: Normal breath sounds. No wheezing or rales.   Abdominal:      General: Bowel sounds are normal. There is no distension.      Palpations: Abdomen is soft.      Tenderness: There is no abdominal tenderness. There is no guarding or rebound.   Musculoskeletal:      Cervical back: Normal range of motion and neck supple.   Lymphadenopathy:      Cervical: No cervical adenopathy.   Neurological:      Mental Status: She is alert.         ASSESSMENT/PLAN:   (Z01.818) Pre-operative clearance  (primary encounter diagnosis)  Plan: CBC W Differential W Platelet [E], Comp         Metabolic Panel (14) [E], EKG In-Office [22904]  -Stable vitals.  Unremarkable physical exam.  EKG done in clinic- Normal rate, sinus rhythm, normal axis. No ST/T wave abnormalities. Normal study.  CBC, CMP to lab. Pending results will fax clearance to surgeon.   Forms filled out for employer. Follow up as needed.                  Responsible party/patient verbalized understanding of information discussed. No barriers to learning observed.            Orders This Visit:  Orders Placed This Encounter   Procedures    CBC W Differential W Platelet [E]    Comp Metabolic Panel (14) [E]       Meds This Visit:  Requested Prescriptions      No prescriptions  requested or ordered in this encounter       Imaging & Referrals:  ELECTROCARDIOGRAM, COMPLETE     Chaperone offered at visit today.     The 21st Century cures Act makes medical notes like these available to patients in the interest of transparency.  However, be advised that this is a medical document.  It is intended as peer to peer communication.  It is written in medical language and may contain abbreviations or verbiage that are unfamiliar.  It may appear blunt or direct.  Medical documents are intended to carry relevant information, facts as evident, and the clinical opinion of the practitioner.      This note was created by [employer] voice recognition. Errors in content may be related to improper recognition by the system; efforts to review and correct have been done but errors may still exist. Please contact me with any questions.       11/12/2024  Pelon Gibbons MD       [1]   Allergies  Allergen Reactions    Amoxicillin RASH

## 2024-11-15 NOTE — DISCHARGE INSTRUCTIONS
Post Op Instructions    Thank you for choosing the Surgery team at [facility].  Managing Your Pain  Take your medications as directed and as needed. You may also take 1000 mg of acetaminophen (Tylenol®) every 6 hours as needed for pain.  Call our office if the medication prescribed for you doesn't ease your pain.  Don't drive or drink alcohol while you're taking prescription pain medication  Pain medication should help you resume your normal activities. Take enough medication to do your exercises comfortably. However, it's normal for your pain to increase a little as you start to be more active.  Keep track of when you take your pain medication. It works best 30 to 45 minutes after you take it. Taking it when your pain first begins is better than waiting for the pain to get worse.  Pain medication may cause constipation  Managing Constipation  Go to the bathroom at the same time every day.   Exercise. Walking is an excellent form of exercise.  Drink 8 (8-ounce) glasses (2 liters) of liquids daily, if you can. Drink water, juices (such as prune juice), soups, ice cream shakes, and other drinks that don't have caffeine.   If you haven't had a bowel movement in 3 days, call our office  Caring for Your Incision  It's normal for the skin below your incision to feel numb. This happens because some of the nerves were cut during your surgery. The numbness will go away over time.  Leave the dressing on for 48 hours after surgery. The clear outer dressing (Tegaderm) can then come off.  The sutures (stitches) in your incision are dissolvable, and will not need to be removed.   If you go home with Steri-Strips on your incision, they will loosen and fall off by themselves. If they haven't fallen off within 14 days, you can take them off.  If the area around your incision is red, puffy, or if you have any drainage from your incision, contact our office.  Showering  You may shower 48 hours after surgery. When you  shower, use mild soap to gently wash your incision. After you shower, pat the area dry with a clean towel. Don't rub over your incision. Leave your incision uncovered, unless there's drainage.  Avoid tub baths until your surgeon says it's okay.  Eating and Drinking  You may resume a regular diet when you go home. You may not be able to eat as much as you did before your surgery. Try to eat 4 to 6 small meals a day.  Drink plenty of liquids. Try to drink 8 (8-ounce) glasses of liquids every day. Don't drink alcohol until you check with your surgeon.  Activity and Exercise  Remember, recovery after surgery takes several weeks. It is common to feel tired or fatigued. Rest as needed.   Doing aerobic exercise, such as walking and stair climbing, will help you gain strength and feel better. Gradually increase the distance you walk. Rest or stop as needed.  Don't lift anything heavier than 10 pounds for at least 8 weeks after your surgery or until your surgeon says it's okay.   Avoid strenuous activity and exercises until your surgeon says it's okay.  Ask your surgeon when it is okay for you to drive.   Ask your surgeon when you can return to work.  When to Call:  Let us know if you experience the following symptoms:  Fever of 100.4° F (38°C) or higher  Cloudy or smelly drainage from the incision site  The skin around your incision is warm/red/swollen  Sudden increase in pain or new pain  Shaking chills  Fast pulse  Shortness of breath or chest pain  Nausea or vomiting.   Diarrhea  Constipation that isn't relieved in 3 days  Signs of a bladder infection (urinating more often than usual, burning while urinating, bleeding or hesitancy while urinating).  Any new or unexplained symptoms    Our office will contact you for a follow up appointment.     Please arrive at the following location:  [facility] at [facility]: [address] [place] [address]  Please call us at [phone] and ask  to be connected to the general surgeon on call for [facility] if you have any questions.      HOME INSTRUCTIONS  AMBSURG HOME CARE INSTRUCTIONS: POST-OP ANESTHESIA  The medication that you received for sedation or general anesthesia can last up to 24 hours. Your judgment and reflexes may be altered, even if you feel like your normal self.      We Recommend:   Do not drive any motor vehicle or bicycle   Avoid mowing the lawn, playing sports, or working with power tools/applicances (power saws, electric knives or mixers)   That you have someone stay with you on your first night home   Do not drink alcohol or take sleeping pills or tranquilizers   Do not sign legal documents within 24 hours of your procedure   If you had a nerve block for your surgery, take extra care not to put any pressure on your arm or hand for 24 hours    It is normal:  For you to have a sore throat if you had a breathing tube during surgery (while you were asleep!). The sore throat should get better within 48 hours. You can gargle with warm salt water (1/2 tsp in 4 oz warm water) or use a throat lozenge for comfort  To feel muscle aches or soreness especially in the abdomen, chest or neck. The achy feeling should go away in the next 24 hours  To feel weak, sleepy or \"wiped out\". Your should start feeling better in the next 24 hours.   To experience mild discomforts such as sore lip or tongue, headache, cramps, gas pains or a bloated feeling in your abdomen.   To experience mild back pain or soreness for a day or two if you had spinal or epidural anesthesia.   If you had laparoscopic surgery, to feel shoulder pain or discomfort on the day of surgery.   For some patients to have nausea after surgery/anesthesia    If you feel nausea or experience vomiting:   Try to move around less.   Eat less than usual or drink only liquids until the next morning   Nausea should resolve in about 24 hours    If you have a problem when you are at home:     Call your surgeons office   Discharge Instructions: After Your Surgery  You’ve just had surgery. During surgery, you were given medicine called anesthesia to keep you relaxed and free of pain. After surgery, you may have some pain or nausea. This is common. Here are some tips for feeling better and getting well after surgery.   Going home  Your healthcare provider will show you how to take care of yourself when you go home. They'll also answer your questions. Have an adult family member or friend drive you home. For the first 24 hours after your surgery:   Don't drive or use heavy equipment.  Don't make important decisions or sign legal papers.  Take medicines as directed.  Don't drink alcohol.  Have someone stay with you, if needed. They can watch for problems and help keep you safe.  Be sure to go to all follow-up visits with your healthcare provider. And rest after your surgery for as long as your provider tells you to.   Coping with pain  If you have pain after surgery, pain medicine will help you feel better. Take it as directed, before pain becomes severe. Also, ask your healthcare provider or pharmacist about other ways to control pain. This might be with heat, ice, or relaxation. And follow any other instructions your surgeon or nurse gives you.      Stay on schedule with your medicine.     Tips for taking pain medicine  To get the best relief possible, remember these points:   Pain medicines can upset your stomach. Taking them with a little food may help.  Most pain relievers taken by mouth need at least 20 to 30 minutes to start to work.  Don't wait till your pain becomes severe before you take your medicine. Try to time your medicine so that you can take it before starting an activity. This might be before you get dressed, go for a walk, or sit down for dinner.  Constipation is a common side effect of some pain medicines. Call your healthcare provider before taking any medicines such as laxatives or  stool softeners to help ease constipation. Also ask if you should skip any foods. Drinking lots of fluids and eating foods such as fruits and vegetables that are high in fiber can also help. Remember, don't take laxatives unless your surgeon has prescribed them.  Drinking alcohol and taking pain medicine can cause dizziness and slow your breathing. It can even be deadly. Don't drink alcohol while taking pain medicine.  Pain medicine can make you react more slowly to things. Don't drive or run machinery while taking pain medicine.  Your healthcare provider may tell you to take acetaminophen to help ease your pain. Ask them how much you're supposed to take each day. Acetaminophen or other pain relievers may interact with your prescription medicines or other over-the-counter (OTC) medicines. Some prescription medicines have acetaminophen and other ingredients in them. Using both prescription and OTC acetaminophen for pain can cause you to accidentally overdose. Read the labels on your OTC medicines with care. This will help you to clearly know the list of ingredients, how much to take, and any warnings. It may also help you not take too much acetaminophen. If you have questions or don't understand the information, ask your pharmacist or healthcare provider to explain it to you before you take the OTC medicine.   Managing nausea  Some people have an upset stomach (nausea) after surgery. This is often because of anesthesia, pain, or pain medicine, less movement of food in the stomach, or the stress of surgery. These tips will help you handle nausea and eat healthy foods as you get better. If you were on a special food plan before surgery, ask your healthcare provider if you should follow it while you get better. Check with your provider on how your eating should progress. It may depend on the surgery you had. These general tips may help:   Don't push yourself to eat. Your body will tell you when to eat and how  much.  Start off with clear liquids and soup. They're easier to digest.  Next try semi-solid foods as you feel ready. These include mashed potatoes, applesauce, and gelatin.  Slowly move to solid foods. Don’t eat fatty, rich, or spicy foods at first.  Don't force yourself to have 3 large meals a day. Instead eat smaller amounts more often.  Take pain medicines with a small amount of solid food, such as crackers or toast. This helps prevent nausea.  When to call your healthcare provider  Call your healthcare provider right away if you have any of these:   You still have too much pain, or the pain gets worse, after taking the medicine. The medicine may not be strong enough. Or there may be a complication from the surgery.  You feel too sleepy, dizzy, or groggy. The medicine may be too strong.  Side effects such as nausea or vomiting. Your healthcare provider may advise taking other medicines to .  Skin changes such as rash, itching, or hives. This may mean you have an allergic reaction. Your provider may advise taking other medicines.  The incision looks different (for instance, part of it opens up).  Bleeding or fluid leaking from the incision site, and weren't told to expect that.  Fever of 100.4°F (38°C) or higher, or as directed by your provider.  Call 911  Call 911 right away if you have:   Trouble breathing  Facial swelling    If you have obstructive sleep apnea   You were given anesthesia medicine during surgery to keep you comfortable and free of pain. After surgery, you may have more apnea spells because of this medicine and other medicines you were given. The spells may last longer than normal.    At home:  Keep using the continuous positive airway pressure (CPAP) device when you sleep. Unless your healthcare provider tells you not to, use it when you sleep, day or night. CPAP is a common device used to treat obstructive sleep apnea.  Talk with your provider before taking any pain medicine, muscle relaxants,  or sedatives. Your provider will tell you about the possible dangers of taking these medicines.  Contact your provider if your sleeping changes a lot even when taking medicines as directed.  Chaim last reviewed this educational content on 10/1/2021  © 8331-0718 [employer]. All rights reserved. This information is not intended as a substitute for professional medical care. Always follow your healthcare professional's instructions.

## 2024-11-18 NOTE — ANESTHESIA PREPROCEDURE EVALUATION
Anesthesia PreOp Note    HPI:     Toyin Barajas is a 54 year old female who presents for preoperative consultation requested by: Demetrio Spence MD    Date of Surgery: 2024    Procedure(s):  Xi Robotic-assisted, laparoscopic, possible open cholecystectomy, possible intraoperative cholangiogram  Indication: Cholelithiasis and acute cholecystitis without obstruction [K80.00]    Relevant Problems   No relevant active problems       NPO:  Last Liquid Consumption Date: 24  Last Liquid Consumption Time: 0800  Last Solid Consumption Date: 24  Last Solid Consumption Time: 1900  Last Liquid Consumption Date: 24          History Review:  Patient Active Problem List    Diagnosis Date Noted    Fatigue 2023    Class 1 obesity due to excess calories without serious comorbidity with body mass index (BMI) of 32.0 to 32.9 in adult 2023    Post-menopausal 2023    Vaginal dryness 2023    Centromere antibody positive     Raynaud disease     Raynaud's phenomenon without gangrene 2014    Closed fracture of lateral malleolus 12/10/2013    Xerosis cutis 2013    Skin tag 2013    PMDD (premenstrual dysphoric disorder) 2012    Family history of thyroid disease 2010    Family history of ischemic heart disease 2008       Past Medical History:    Ankle fracture    R    ANXIETY    Anxiety state    Back problem    DEPRESSION    Depression    Esophageal reflux    HEADACHES    sinus    HEMORRHOIDS    HYPERLIPIDEMIA    mild    Osteoarthritis    OTHER DISEASES    fx'd 5th finger    Raynaud disease    Rheumatoid arthritis (HCC)    Visual impairment    Glasses       Past Surgical History:   Procedure Laterality Date      04/08/2008    x2    D & c  06/10/2009    SAB/    Other surgical history      lower lid lac repair L as child    Other surgical history      Hemorrhoidectomy    Tubal ligation  2010       Prescriptions Prior to  Admission[1]  Current Medications and Prescriptions Ordered in Epic[2]    Allergies[3]    Family History   Problem Relation Age of Onset    Other (Other) Mother         hypothyroid, RA    Thyroid disease Mother     Cancer Father         bladder Ca    Heart Disorder Father         s/p CABG in 40's.  living age 71 in 2016    Heart Disease Father     Hypertension Father     Other (Other) Brother         ADD    Pulmonary Disease Brother         asthma    Heart Disorder Maternal Grandmother         MI CABG    Other (Other) Maternal Grandmother     Heart Disorder Maternal Grandfather     Other (Other) Maternal Grandfather         CVA, alzheimers- dx approx age 80.  d alziherimers    Pancreatic Cancer Paternal Grandmother 78    Cancer Paternal Grandmother         pancreatic Ca    Heart Disorder Paternal Grandfather         CAD    Cancer Other         bladder    Other (Other) Other         No Fhx colon, breast or ovarian CA.  no Fhx melanoma     Social History     Socioeconomic History    Marital status:    Tobacco Use    Smoking status: Never    Smokeless tobacco: Never   Vaping Use    Vaping status: Never Used   Substance and Sexual Activity    Alcohol use: Yes     Comment: socially, 1x/week    Drug use: Never    Sexual activity: Yes     Partners: Male     Birth control/protection: Tubal Ligation     Comment: , together since 2007       Available pre-op labs reviewed.  Lab Results   Component Value Date    WBC 8.1 11/12/2024    RBC 4.27 11/12/2024    HGB 12.6 11/12/2024    HCT 39.4 11/12/2024    MCV 92.3 11/12/2024    MCH 29.5 11/12/2024    MCHC 32.0 11/12/2024    RDW 13.9 11/12/2024    .0 11/12/2024     Lab Results   Component Value Date     11/12/2024    K 3.9 11/12/2024     11/12/2024    CO2 30.0 11/12/2024    BUN 25 (H) 11/12/2024    CREATSERUM 0.81 11/12/2024    GLU 85 11/12/2024    CA 9.9 11/12/2024          Vital Signs:  Body mass index is 26.52 kg/m².   height is 1.575 m (5' 2\")  and weight is 65.8 kg (145 lb). Her oral temperature is 98.7 °F (37.1 °C). Her blood pressure is 114/66 and her pulse is 64. Her respiration is 15 and oxygen saturation is 97%.   Vitals:    11/13/24 1720 11/18/24 1117   BP:  114/66   Pulse:  64   Resp:  15   Temp:  98.7 °F (37.1 °C)   TempSrc:  Oral   SpO2:  97%   Weight: 66.7 kg (147 lb) 65.8 kg (145 lb)   Height: 1.575 m (5' 2\")         Anesthesia Evaluation     Patient summary reviewed and Nursing notes reviewed    No history of anesthetic complications   Airway   Mallampati: I  TM distance: >3 FB  Neck ROM: full  Dental - Dentition appears grossly intact     Pulmonary - negative ROS and normal exam   (-) asthma, shortness of breath, recent URI  Cardiovascular   Exercise tolerance: good  (-) hypertension, dysrhythmias, angina, FONTENOT    Rhythm: regular  Rate: normal  ROS comment: EKG (11/12/2024)  Normal sinus rhythm   Normal ECG   When compared with ECG of 11-MAR-2023 07:13,   No significant change was found   Confirmed by Dorene Gibbons ([id number]) on 11/12/2024 3:42:21 PM         Neuro/Psych    (+)  headaches, anxiety/panic attacks,  depression    (-) seizures, neuromuscular disease    GI/Hepatic/Renal    (+) GERD  (-) hepatitis, renal disease    Endo/Other    (+) arthritis  (-) diabetes mellitus    Comments: Rheumatoid Arthritis   Abdominal  - normal exam                 Anesthesia Plan:   ASA:  2  Plan:   General  Monitors and Lines:   Additonal IV  Airway:  ETT  Post-op Pain Management: IV analgesics  Informed Consent Plan and Risks Discussed With:  Patient  Use of Blood Products Discussed With:  Patient  Blood Product Use Consented    Discussed plan with:  CRNA and surgeon      I have informed Toyin Katty Klaleta Barajas and/or legal guardian or family member of the nature of the anesthetic plan, benefits, risks including possible dental damage if relevant, major complications, and any alternative forms of anesthetic management.   All of the patient's  questions were answered to the best of my ability. The patient desires the anesthetic management as planned.  Oksana Alexander,   11/18/2024 11:53 AM  Present on Admission:  **None**           [1]   Medications Prior to Admission   Medication Sig Dispense Refill Last Dose/Taking    Cholecalciferol (VITAMIN D3) 25 MCG (1000 UT) Oral Cap Take 1 tablet by mouth daily.   11/17/2024 at  9:00 AM    polyethylene glycol, PEG 3350, 17 g Oral Powd Pack Take 17 g by mouth daily.   11/17/2024    escitalopram 20 MG Oral Tab Take 1 tablet (20 mg total) by mouth daily. (Patient taking differently: Take 1 tablet (20 mg total) by mouth every morning.) 90 tablet 0 11/17/2024 at  9:00 AM    acetaminophen 500 MG Oral Tab Take 1 tablet (500 mg total) by mouth as needed for Pain.   Past Week    ibuprofen 600 MG Oral Tab Take 1 tablet (600 mg total) by mouth every 6 hours with food 20 tablet 0 11/11/2024    FLUTICASONE PROPIONATE 50 MCG/ACT Nasal Suspension SPRAY 2 PUFFS IN EACH NOSTRIL DAILY 48 g 0 Past Week    Multiple Vitamin (DAILY MULTIVITAMIN OR) Take  by mouth.   11/17/2024 at  9:00 AM   [2]   Current Facility-Administered Medications Ordered in Epic   Medication Dose Route Frequency Provider Last Rate Last Admin    lactated ringers infusion   Intravenous Continuous Demetrio Spence MD 20 mL/hr at 11/18/24 1125 New Bag at 11/18/24 1125    ceFAZolin (Ancef) 2g in 10mL IV syringe premix  2 g Intravenous Once Demetrio Spence MD         No current [facility]-ordered outpatient medications on file.   [3]   Allergies  Allergen Reactions    Amoxicillin RASH     No peeling/blisters  No organ damage

## 2024-11-18 NOTE — BRIEF OP NOTE
Pre-Operative Diagnosis: Cholelithiasis and acute cholecystitis without obstruction [K80.00]     Post-Operative Diagnosis: Cholelithiasis and acute cholecystitis without obstruction [K80.00]      Procedure Performed:   Xi Robotic-assisted, laparoscopic, possible open cholecystectomy, possible intraoperative cholangiogram    Surgeons and Role:     * Demetrio Spence MD - Primary    Assistant(s):  Surgical Assistant.: Cecilio Brown RSA     Surgical Findings: see dictation     Specimen: GB     Estimated Blood Loss: No data recorded    Dictation Number:  NA    Demetrio Spence MD  11/18/2024  3:57 PM

## 2024-11-18 NOTE — ANESTHESIA PROCEDURE NOTES
Peripheral IV  Date/Time: 11/18/2024 3:09 PM  Inserted by: Mena Hardwick CRNA    Placement  Needle size: 18 G  Laterality: left  Location: forearm  Local anesthetic: none  Site prep: alcohol  Technique: anatomical landmarks  Attempts: 1

## 2024-11-18 NOTE — INTERVAL H&P NOTE
Patient seen and examined. No changes to the attached history and physical are noted.     54 year old female presenting today for planned cholecystectomy.    Demetrio Spence MD  [facility] General Surgical Oncology  [facility]  Pager [phone]  [email or web address]

## 2024-11-18 NOTE — ANESTHESIA PROCEDURE NOTES
Airway  Date/Time: 11/18/2024 3:05 PM  Urgency: Elective      General Information and Staff    Patient location during procedure: OR  Anesthesiologist: Oksana Alexander DO  Resident/CRNA: Mena Hardwick CRNA  Performed: CRNA   Performed by: Mena Hardwick CRNA  Authorized by: Mena Hardwick CRNA      Indications and Patient Condition  Indications for airway management: anesthesia  Sedation level: deep  Preoxygenated: yes  Patient position: sniffing  Mask difficulty assessment: 1 - vent by mask    Final Airway Details  Final airway type: endotracheal airway      Successful airway: ETT  Cuffed: yes   Successful intubation technique: direct laryngoscopy  Endotracheal tube insertion site: oral  Blade: Collins  Blade size: #2  ETT size (mm): 7.0    Placement verified by: capnometry   Measured from: teeth  ETT to teeth (cm): 21  Number of attempts at approach: 1    Additional Comments  Dentition as preop

## 2024-11-18 NOTE — ANESTHESIA POSTPROCEDURE EVALUATION
Patient: Toyin Barajas    Procedure Summary       Date: 11/18/24 Room / Location: [facility] MAIN OR 06 / [facility] MAIN OR    Anesthesia Start: 1458 Anesthesia Stop: 1632    Procedure: Xi Robotic-assisted, laparoscopic cholecystectomy (Abdomen) Diagnosis:       Cholelithiasis and acute cholecystitis without obstruction      (Cholelithiasis and acute cholecystitis without obstruction [K80.00])    Surgeons: Demetrio Spence MD Anesthesiologist: Jackson Jacobo MD    Anesthesia Type: general ASA Status: 2            Anesthesia Type: general    Vitals Value Taken Time   /85 11/18/24 1631   Temp 99.5 °F (37.5 °C) 11/18/24 1631   Pulse 78 11/18/24 1631   Resp 17 11/18/24 1631   SpO2 98 % 11/18/24 1631   Vitals shown include unfiled device data.    EM AN Post Evaluation:   Patient Evaluated in PACU  Patient Participation: complete - patient participated  Level of Consciousness: awake and alert  Pain Score: 3  Pain Management: adequate  Airway Patency:patent  Yes    Nausea/Vomiting: none  Cardiovascular Status: acceptable  Respiratory Status: acceptable  Postoperative Hydration acceptable      Mena Hardwick CRNA  11/18/2024 4:32 PM

## 2024-11-18 NOTE — ANESTHESIA POSTPROCEDURE EVALUATION
Patient: Toyin Barajas    Procedure Summary       Date: 11/18/24 Room / Location: [facility] MAIN OR 06 / [facility] MAIN OR    Anesthesia Start: 1458 Anesthesia Stop: 1632    Procedure: Xi Robotic-assisted, laparoscopic cholecystectomy (Abdomen) Diagnosis:       Cholelithiasis and acute cholecystitis without obstruction      (Cholelithiasis and acute cholecystitis without obstruction [K80.00])    Surgeons: Demetrio Spence MD Anesthesiologist: Jackson Jacobo MD    Anesthesia Type: general ASA Status: 2            Anesthesia Type: general    Vitals Value Taken Time   /75 11/18/24 1720   Temp 98.2 °F (36.8 °C) 11/18/24 1720   Pulse 68 11/18/24 1726   Resp 12 11/18/24 1726   SpO2 96 % 11/18/24 1726   Vitals shown include unfiled device data.    [facility] AN Post Evaluation:   Patient Evaluated in PACU  Patient Participation: complete - patient participated  Level of Consciousness: awake  Pain Score: 2  Pain Management: adequate  Airway Patency:patent  Dental exam unchanged from preop  Yes    Cardiovascular Status: hemodynamically stable  Respiratory Status: spontaneous ventilation  Postoperative Hydration euvolemic      Jackson Jacobo MD  11/18/2024 5:27 PM

## 2024-11-20 NOTE — TELEPHONE ENCOUNTER
Post op call made to patient. Patient states she is doing much better. Denies fevers, no nausea or vomiting. States pain is 2, reports not taking with PRN medication. Patient is tolerating activity without complaints. No concerns with surgical sites. Wound care instructions provided. Path is still pending.    Post op appointment scheduled with Dr. Spence on 12/3 at 9:45 am at [place].    Patient agrees to call if any problems or concerns.

## 2024-11-22 NOTE — OPERATIVE REPORT
Date of operation 11/18/2024     Preoperative diagnosis:  1.  Biliary colic     Operations performed:  1.  Robotic assisted cholecystectomy       Postoperative diagnosis:  1.  Same     Operating surgeon:  1.  Demetrio Spence MD     Assistant:  1.Surgical Assistant.: Cecilio Brown RSA      Indications:  54-year-old female who was diagnosed with biliary colic.  She presents for cholecystectomy.      Details of the operation:     Patient was brought to the operating room laid supine on the operative table.  General tracheal anesthesia was induced without complications.  Operation points were padded properly.  The arms were tucked.  The abdomen was prepped and draped in the standard sterile manner.  A timeout was completed verifying the patient's name, procedure be performed and the administration of antibiotics.  Everybody in the room was in agreement.  A nick in the skin was made in the left upper quadrant a Verres needle was introduced.  Pneumoperitoneum was established.  4 x 8 mm working ports were placed in a straight line across the mid abdomen.  The robot was docked per usual.  Attention was directed to his right upper quadrant.  The gallbladder was grasped at its dome and dissected in a dome down manner.  The cystic duct and cystic artery were clearly identified after releasing attachments to the liver.  The cystic artery was clipped. The duct was doubly clipped and ligated.  Specimen was retrieved to a bag per usual.  The fascia was closed in a running manner using 0 Vicryl suture at the extraction site.  The skin subcutaneous tissue were irrigated.  Hemostasis was assured after reinflating the abdomen and the skin approximated in subcuticular manner.  I was present for the entire case.     Demetrio Spence MD  Complex General Surgical Oncology  [facility]  [email or web address]

## 2024-12-04 NOTE — PROGRESS NOTES
Here for postoperative visit  Overall doing well  Reviewed pathology  Pain controlled, initial pain resolved    May follow-up with me on an as-needed basis    Demetrio Spence MD  Complex General Surgical Oncology  [facility]  [email or web address]

## 2024-12-10 NOTE — H&P
HPI:    Toyin Barajas is a 54 year old female presents to clinic for annual physical exam.  Overall, doing well.  No acute concerns.  S/p cholecystectomy, doing well.  Had pain for 4 days which is since improved.  Normal appetite, low-fat diet.  Mostly normal bowel movements urination.  No change in sleep habits.  She walks daily for exercise.  Patient's last menstrual period was 2022.  Menopause.    HISTORY:  Past Medical History:    Ankle fracture    R    ANXIETY    Anxiety state    Back problem    DEPRESSION    Depression    Esophageal reflux    HEADACHES    sinus    HEMORRHOIDS    HYPERLIPIDEMIA    mild    Osteoarthritis    OTHER DISEASES    fx'd 5th finger    Raynaud disease    Rheumatoid arthritis (HCC)    Visual impairment    Glasses      Past Surgical History:   Procedure Laterality Date      04/08/2008    x2    D & c  06/10/2009    SAB/    Other surgical history      lower lid lac repair L as child    Other surgical history      Hemorrhoidectomy    Tubal ligation  2010      Family History   Problem Relation Age of Onset    Other (Other) Mother         hypothyroid, RA    Thyroid disease Mother     Cancer Father         bladder Ca    Heart Disorder Father         s/p CABG in 40's.  living age 71 in 2016    Heart Disease Father     Hypertension Father     Other (Other) Brother         ADD    Pulmonary Disease Brother         asthma    Heart Disorder Maternal Grandmother         MI CABG    Other (Other) Maternal Grandmother     Heart Disorder Maternal Grandfather     Other (Other) Maternal Grandfather         CVA, alzheimers- dx approx age 80.  d alziherimers    Pancreatic Cancer Paternal Grandmother 78    Cancer Paternal Grandmother         pancreatic Ca    Heart Disorder Paternal Grandfather         CAD    Cancer Other         bladder    Other (Other) Other         No Fhx colon, breast or ovarian CA.  no Fhx melanoma      Social History:   Social History      Socioeconomic History    Marital status:    Tobacco Use    Smoking status: Never    Smokeless tobacco: Never   Vaping Use    Vaping status: Never Used   Substance and Sexual Activity    Alcohol use: Yes     Comment: socially, 1x/week    Drug use: Never    Sexual activity: Yes     Partners: Male     Birth control/protection: Tubal Ligation     Comment: , together since 2007   Social History Narrative    Subbing        2 and 2 step kids--5, 7, 13, 14        Diet: well balanced, enough veggies, combo    Exercise: 3 times per week    Sleep: ok, hard to stay asleep    Stress: high, not good support        Periods: regular    Last pap: 2014--normal    Mammo: 2014        Sees Justino        Never had c-scope        2016:  Lives with , kids and step kids.  Works as         Medications (Active prior to today's visit):  Current Outpatient Medications   Medication Sig Dispense Refill    Cholecalciferol (VITAMIN D3) 25 MCG (1000 UT) Oral Cap Take 1 tablet by mouth daily.      escitalopram 20 MG Oral Tab Take 1 tablet (20 mg total) by mouth daily. 90 tablet 0    acetaminophen 500 MG Oral Tab Take 1 tablet (500 mg total) by mouth as needed for Pain.      ibuprofen 600 MG Oral Tab Take 1 tablet (600 mg total) by mouth every 6 hours with food 20 tablet 0    FLUTICASONE PROPIONATE 50 MCG/ACT Nasal Suspension SPRAY 2 PUFFS IN EACH NOSTRIL DAILY 48 g 0    Multiple Vitamin (DAILY MULTIVITAMIN OR) Take  by mouth.      polyethylene glycol, PEG 3350, 17 g Oral Powd Pack Take 17 g by mouth daily. (Patient not taking: Reported on 12/9/2024)         Allergies:  Allergies[1]           ROS:   Review of Systems   All other systems reviewed and are negative.      PHYSICAL EXAM:     Vitals:    12/09/24 1753   BP: 124/80   BP Location: Left arm   Patient Position: Sitting   Cuff Size: adult   Pulse: 76   Weight: 145 lb (65.8 kg)   Height: 5' 2\" (1.575 m)     Physical Exam  Vitals reviewed.    Constitutional:       General: She is not in acute distress.  HENT:      Head: Normocephalic and atraumatic.      Right Ear: Tympanic membrane, ear canal and external ear normal.      Left Ear: Tympanic membrane, ear canal and external ear normal.      Nose: Nose normal.      Mouth/Throat:      Pharynx: Uvula midline.   Eyes:      Conjunctiva/sclera: Conjunctivae normal.      Pupils: Pupils are equal, round, and reactive to light.   Neck:      Thyroid: No thyromegaly.   Cardiovascular:      Rate and Rhythm: Normal rate and regular rhythm.      Heart sounds: Normal heart sounds. No murmur heard.  Pulmonary:      Effort: Pulmonary effort is normal. No respiratory distress.      Breath sounds: Normal breath sounds. No wheezing or rales.   Abdominal:      General: Bowel sounds are normal. There is no distension.      Palpations: Abdomen is soft.      Tenderness: There is no abdominal tenderness. There is no guarding or rebound.   Musculoskeletal:      Cervical back: Normal range of motion and neck supple.   Lymphadenopathy:      Cervical: No cervical adenopathy.   Neurological:      Mental Status: She is alert.         ASSESSMENT/PLAN:   (Z00.00) Annual physical exam  (primary encounter diagnosis)  Plan: Lipid Panel [E], Hemoglobin A1C [E]  - Immunizations UTD. Declines flu vaccine.    - Reinforced healthy diet, lifestyle, and exercise.  - Past Medical/Social/Family histories reviewed  - Regular dental visits recommended   - Regular eye exams recommended     Health Maintenance   Topic Date Due    Pap Smear  08/31/2027     Health Maintenance   Topic Date Due    Mammogram  11/04/2025      Health Maintenance   Topic Date Due    Colorectal Cancer Screening  12/20/2025       Follow up in 1 year or sooner if needed                 Responsible party/patient verbalized understanding of information discussed. No barriers to learning observed.            Orders This Visit:  Orders Placed This Encounter   Procedures    Lipid  Panel [E]    Hemoglobin A1C [E]    Fluzone trivalent vaccine, PF 0.5mL, 6mo+ (46892)       Meds This Visit:  Requested Prescriptions      No prescriptions requested or ordered in this encounter       Imaging & Referrals:  INFLUENZA VACCINE, TRI, PRESERV FREE, 0.5 ML     Chaperone offered at visit today.     The 21st Century cures Act makes medical notes like these available to patients in the interest of transparency.  However, be advised that this is a medical document.  It is intended as peer to peer communication.  It is written in medical language and may contain abbreviations or verbiage that are unfamiliar.  It may appear blunt or direct.  Medical documents are intended to carry relevant information, facts as evident, and the clinical opinion of the practitioner.      This note was created by [employer] voice recognition. Errors in content may be related to improper recognition by the system; efforts to review and correct have been done but errors may still exist. Please contact me with any questions.       12/9/2024  Pelon Gibbons MD       [1]   Allergies  Allergen Reactions    Amoxicillin RASH     No peeling/blisters  No organ damage

## 2025-01-22 NOTE — ED INITIAL ASSESSMENT (HPI)
Pt came in due to cough, congestion, runny nose, sore throat, and headache for the past 3 days. Pt has easy non labored respirations.

## 2025-01-22 NOTE — ED PROVIDER NOTES
Patient Seen in: [facility]      History     Chief Complaint   Patient presents with    Cough/URI     Stated Complaint: cough, runny nose, sore throat, headaches    Subjective:   HPI  Toyin Barajas is a 54 year old female here for cough, runny nose and sore throat for 3 days.  Sinus symptoms for over a week getting worse.  Headache slightly worse.  History of the same. not thunderclap in nature.  Gradual onset.  Symptoms better with OTC treatment, but feels sinus symptoms are persistent.  No unilateral weakness, vision changes, or syncope.        Objective:     Past Medical History:    Ankle fracture    R    ANXIETY    Anxiety state    Back problem    DEPRESSION    Depression    Esophageal reflux    HEADACHES    sinus    HEMORRHOIDS    HYPERLIPIDEMIA    mild    Osteoarthritis    OTHER DISEASES    fx'd 5th finger    Raynaud disease    Rheumatoid arthritis (HCC)    Visual impairment    Glasses              Past Surgical History:   Procedure Laterality Date      04/08/2008    x2    D & c  06/10/2009    SAB/    Other surgical history      lower lid lac repair L as child    Other surgical history      Hemorrhoidectomy    Tubal ligation  2010                No pertinent social history.            Review of Systems    Positive for stated complaint: cough, runny nose, sore throat, headaches  Other systems are as noted in HPI.  Constitutional and vital signs reviewed.      All other systems reviewed and negative except as noted above.    Physical Exam     ED Triage Vitals [25 1236]   /90   Pulse 77   Resp 20   Temp 99 °F (37.2 °C)   Temp src Oral   SpO2 100 %   O2 Device None (Room air)       Current Vitals:   Vital Signs  BP: 144/90  Pulse: 77  Resp: 20  Temp: 99 °F (37.2 °C)  Temp src: Oral    Oxygen Therapy  SpO2: 100 %  O2 Device: None (Room air)        Physical Exam  Vitals and nursing note reviewed.   Constitutional:       General: She is not in acute  distress.     Appearance: Normal appearance. She is not toxic-appearing or diaphoretic.   HENT:      Head: Normocephalic.        Right Ear: Tympanic membrane and external ear normal.      Left Ear: Tympanic membrane, ear canal and external ear normal.      Nose: Congestion and rhinorrhea (mild) present.      Mouth/Throat:      Mouth: Mucous membranes are moist.      Pharynx: No oropharyngeal exudate.      Comments: Injected posterior pharynx.  Not beefy red in nature  Eyes:      Extraocular Movements: Extraocular movements intact.      Conjunctiva/sclera: Conjunctivae normal.      Pupils: Pupils are equal, round, and reactive to light.   Cardiovascular:      Rate and Rhythm: Normal rate and regular rhythm.      Pulses: Normal pulses.   Pulmonary:      Effort: Pulmonary effort is normal. No respiratory distress.      Breath sounds: No wheezing, rhonchi or rales.   Musculoskeletal:      Cervical back: Normal range of motion. No erythema or rigidity. No pain with movement, spinous process tenderness or muscular tenderness. Normal range of motion.   Lymphadenopathy:      Cervical: No cervical adenopathy.      Right cervical: No superficial, deep or posterior cervical adenopathy.     Left cervical: No superficial, deep or posterior cervical adenopathy.   Skin:     General: Skin is warm.      Capillary Refill: Capillary refill takes less than 2 seconds.      Findings: No lesion or rash.   Neurological:      General: No focal deficit present.      Mental Status: She is alert and oriented to person, place, and time.   Psychiatric:         Mood and Affect: Mood normal.         Behavior: Behavior normal.         Thought Content: Thought content normal.         Judgment: Judgment normal.             ED Course     Labs Reviewed   POCT RAPID STREP - Normal   RAPID SARS-COV-2 BY PCR - Normal   POCT FLU TEST - Normal    Narrative:     This assay is a rapid molecular in vitro test utilizing nucleic acid amplification of influenza A  and B viral RNA.                   MDM             Medical Decision Making  Ddx: URI vs LRI, allergies, reactive, COVID, FLU, RSV, or somatic causes of symptoms    Treat for frontal sinusitis, and bronchospasm.  Albuterol inhaler sent to pharmacy as needed.  No active wheezing on exam.  Supportive care include but not limited to otc cold medications if there is no contraindication, cool mist humidifier, and oral hydration.  Avoid dairy if possible; This increases mucus production.    No stridor, No hot muffled speech, and no signs of compromise. Tolerating PO. Neuro wnl.   Reinforced ER precautions, and f/u care as needed. All questions answered, and reassurance given. No acute distress and cleared for home.     Problems Addressed:  Acute non-recurrent frontal sinusitis: acute illness or injury  Bronchospasm: acute illness or injury  Cough: acute illness or injury    Amount and/or Complexity of Data Reviewed  External Data Reviewed: notes.  Labs: ordered. Decision-making details documented in ED Course.     Details: Independent interpretation. Reviewed with patient    Risk  OTC drugs.  Prescription drug management.        Disposition and Plan     Clinical Impression:  1. Acute non-recurrent frontal sinusitis    2. Cough    3. Bronchospasm         Disposition:  Discharge  1/22/2025  1:14 pm    Follow-up:  Pelon Gibbons MD  [address] [place] [address]  [phone]                Medications Prescribed:  Discharge Medication List as of 1/22/2025  1:16 PM        START taking these medications    Details   azithromycin (ZITHROMAX Z-KYLE) 250 MG Oral Tab 500 mg once followed by 250 mg daily x 4 days, Normal, Disp-6 tablet, R-0      albuterol 108 (90 Base) MCG/ACT Inhalation Aero Soln Inhale 1 puff and hold breath for 10 seconds then exhale.  Wait 1 full minute and repeat for second puff.  Use every 4-6 hours as needed., Normal, Disp-1 each, R-0NPI [id number]. Collaborating MD Jess Galloway.                  Supplementary Documentation:

## 2025-05-28 NOTE — ED PROVIDER NOTES
Patient Seen in: [facility]        History  Chief Complaint   Patient presents with    Nasal Congestion     Stated Complaint:     Subjective:   55 y/o female with medical conditions as noted below presents with c/o nasal congestion, facial pressure and non prod cough x 1 week. No fever, chills, chest pain, sob, headache, dizziness, aching teeth. Has been taking Dayquil which helps with congestion                      Objective:     Past Medical History:    Ankle fracture    R    ANXIETY    Anxiety state    Back problem    DEPRESSION    Depression    Esophageal reflux    HEADACHES    sinus    HEMORRHOIDS    HYPERLIPIDEMIA    mild    Osteoarthritis    OTHER DISEASES    fx'd 5th finger    Raynaud disease    Rheumatoid arthritis (HCC)    Visual impairment    Glasses              Past Surgical History:   Procedure Laterality Date      04/08/2008    x2    D & c  06/10/2009    SAB/    Other surgical history      lower lid lac repair L as child    Other surgical history      Hemorrhoidectomy    Tubal ligation  2010                Social History     Socioeconomic History    Marital status:    Tobacco Use    Smoking status: Never    Smokeless tobacco: Never   Vaping Use    Vaping status: Never Used   Substance and Sexual Activity    Alcohol use: Yes     Comment: socially, 1x/week    Drug use: Never    Sexual activity: Yes     Partners: Male     Birth control/protection: Tubal Ligation     Comment: , together since    Social History Narrative    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  Works as               Review of Systems    Constitutional:  Negative for chills and fever.   HENT:  Positive for congestion and sinus pressure. Negative for sore throat.    Respiratory:  Positive for cough. Negative for shortness of breath.    Gastrointestinal:  Negative for abdominal pain, diarrhea, nausea and vomiting.   Neurological:  Negative for dizziness, light-headedness and headaches.   All other systems reviewed and are negative.      Positive for stated complaint:   Other systems are as noted in HPI.  Constitutional and vital signs reviewed.      All other systems reviewed and negative except as noted above.                  Physical Exam    ED Triage Vitals [05/28/25 1601]   /81   Pulse 73   Resp 22   Temp 98.2 °F (36.8 °C)   Temp src Oral   SpO2 97 %   O2 Device None (Room air)       Current Vitals:   Vital Signs  BP: 146/81  Pulse: 73  Resp: 22  Temp: 98.2 °F (36.8 °C)  Temp src: Oral    Oxygen Therapy  SpO2: 97 %  O2 Device: None (Room air)            Physical Exam  Vitals and nursing note reviewed.   Constitutional:       General: She is not in acute distress.     Appearance: Normal appearance. She is not ill-appearing.   HENT:      Head: Normocephalic.      Right Ear: Tympanic membrane and external ear normal.      Left Ear: Tympanic membrane and external ear normal.      Nose: Nose normal.      Right Turbinates: Not enlarged or swollen.      Left Turbinates: Not enlarged or swollen.      Right Sinus: No maxillary sinus tenderness.      Left Sinus: No maxillary sinus tenderness.      Comments: Mild frontal tenderness     Mouth/Throat:      Mouth: Mucous membranes are moist.      Pharynx: Oropharynx is clear. Uvula midline.      Tonsils: No tonsillar exudate.   Cardiovascular:      Rate and Rhythm: Normal rate and regular rhythm.   Pulmonary:      Effort: Pulmonary effort is normal.      Breath sounds: Normal breath sounds.   Musculoskeletal:         General: Normal range of motion.   Skin:     General: Skin is warm and dry.    Neurological:      Mental Status: She is alert and oriented to person, place, and time.   Psychiatric:         Behavior: Behavior is cooperative.                 ED Course  Labs Reviewed - No data to display                         MDM             Medical Decision Making  Patient is well-appearing.  I discussed differentials with patient including but not limited to viral uri vs sinusitis.  Push fluids, saline nasal spray, good hand washing  RX Flonase  otc meds prn  Fu with PCP. Return/ ED precautions discussed      Problems Addressed:  Viral URI with cough: acute illness or injury    Risk  OTC drugs.  Prescription drug management.        Disposition and Plan     Clinical Impression:  1. Viral URI with cough         Disposition:  Discharge  5/28/2025  4:24 pm    Follow-up:  Melissa Aguirre DO  [address] [address]  [phone]                Medications Prescribed:  Discharge Medication List as of 5/28/2025  4:25 PM        START taking these medications    Details   !! fluticasone propionate 50 MCG/ACT Nasal Suspension 2 sprays by Nasal route daily., Normal, Disp-16 g, R-0       !! - Potential duplicate medications found. Please discuss with provider.                Supplementary Documentation:

## 2025-05-28 NOTE — ED INITIAL ASSESSMENT (HPI)
Patient arrived ambulatory to room c/o symptoms that started 1 week ago. _+nasal congestion +cough. +facial pressure. No fevers. No n/v/d. Easy non labored respirations. No distress.

## 2025-07-15 NOTE — PROGRESS NOTES
CC:    Chief Complaint   Patient presents with    Menopause     Some menopause issues    Medication Problem     Wants to talk about lowering escitalopram       HPI: 54 year old female here to establish care, discuss menopausal symptoms, and discuss lowering dose of Lexapro.  Stopped having a period about 4 years ago, but had a horrible perimenopausal experience.  Started on Lexapro in 2011 around the time perimenopause started due to worsening anxiety and depression.   Will get hot flashes intermittently, but not as bad as they were.   Lost 30 pounds over the course of last year and has kept it off, but it is not working as well for her anymore.  Feels she has to avoid bread and reduce sugar intake to not gain weight, and has been cutting out Diet coke and fake sugar.   Has a protein shake in the morning, a light breakfast, a salad for lunch with only vegetables and dressing, and dinner is more chicken or fish with vegetables.   Drinking a lot of water.  Walks regularly for exercise, and tries to do this three times a week.   Has tried tapering her Lexapro over the last few months, and felt okay overall with this.   Had a hemorrhoidectomy in 2023, which resolved the hemorrhoids, but the hemorrhoids flared up on her again within the last year and are getting bigger.  Does not strain, takes Miralax daily, and the bowel movements are soft so unsure what is triggering this.     ROS:  General:  No fever, no fatigue, no weight changes  HEENT:  Denies congestion or nasal discharge  Cardio:  No chest pain  Pulmonary:  No cough, no SOB  GI:  No N/V/D, intermittent left upper quadrant pain, worsening external hemorrhoids, no constipation   :  No discharge, no dysuria, no polyuria, no hematuria  Dermatologic:  No rashes    Past Medical History[1]    Social History     Socioeconomic History    Marital status:      Spouse name: Not on file    Number of children: Not on file    Years of education: Not on file    Highest  education level: Not on file   Occupational History    Not on file   Tobacco Use    Smoking status: Never    Smokeless tobacco: Never   Vaping Use    Vaping status: Never Used   Substance and Sexual Activity    Alcohol use: Yes     Alcohol/week: 1.0 standard drink of alcohol     Types: 1 Standard drinks or equivalent per week     Comment: socially only    Drug use: No    Sexual activity: Yes     Partners: Male     Birth control/protection: Tubal Ligation     Comment: , together since 2007   Other Topics Concern    Caffeine Concern No    Exercise Yes    Seat Belt Yes    Special Diet Yes    Stress Concern Yes    Weight Concern Yes   Social History Narrative    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  Works as      Social Drivers of Health     Food Insecurity: No Food Insecurity (7/15/2025)    NCSS - Food Insecurity     Worried About Running Out of Food in the Last Year: No     Ran Out of Food in the Last Year: No   Transportation Needs: No Transportation Needs (7/15/2025)    NCSS - Transportation     Lack of Transportation: No   Stress: Not on file   Housing Stability: Not At Risk (7/15/2025)    NCSS - Housing/Utilities     Has Housing: Yes     Worried About Losing Housing: No     Unable to Get Utilities: No       Current Medications[2]    Amoxicillin      Vitals:   Vitals:    07/15/25 1527   BP: 122/72   Pulse: 76   SpO2: 97%   Weight: 147 lb (66.7 kg)   Height: 5' 2\" (1.575 m)       Body mass index is 26.89 kg/m².    Physical:  General:  Alert, appropriate, no acute distress   GI:  Soft, positive bowel sounds, mild left mid-quadrant tenderness but no left upper quadrant  tenderness, no masses, no guarding, no rebound  Dermatologic:  No rashes or lesions    Assessment and Plan: 54-year-old female here to establish care and discuss concerns.    1. Encounter for weight management    - Discussed need to continue with reduction of carbohydrates and added sugars as well as increased protein intake  - Also to start strength training with goal of 2 days a week for 20 minutes at a time  - Will return for physical in December to discuss further    2. Anxiety    - Doing much better since starting Lexapro in 2011 as her menopausal symptoms have greatly improved  - Will try tapering off of Lexapro by taking 10 mg daily for the next 2 months, and consider tapering further at that time if she is doing well  - She will notify me how she is doing in the next few months or sooner if concerns    3. Depression, unspecified depression type    - Will try tapering off of Lexapro as above    4. External hemorrhoids    - Referral to general surgeon given for follow-up due to recurrence of hemorrhoids  - Surgery Referral - In Network    5. Colon cancer screening    - Due for repeat colonoscopy in December, and referral given  - Gastro Referral - In Network        A total of 25 minutes of this visit were spent face to face with the patient and >50% was spent on counseling and coordination of care.     Melissa Aguirre DO  07/15/25  3:35 PM         [1]   Past Medical History:   Allergic rhinitis    Ankle fracture    R    ANXIETY    Anxiety    Anxiety state    Back problem    Constipation    as long as i can remember    DEPRESSION    Depression    Esophageal reflux    HEADACHES    sinus    HEMORRHOIDS    Hemorrhoids    HYPERLIPIDEMIA    mild    Osteoarthritis    OTHER DISEASES    fx'd 5th finger    Raynaud disease    Rheumatoid arthritis (HCC)    Visual impairment    Glasses   [2]   Current Outpatient Medications   Medication Sig Dispense Refill    fluticasone propionate 50 MCG/ACT Nasal Suspension 2 sprays by  Nasal route daily. 16 g 0    albuterol 108 (90 Base) MCG/ACT Inhalation Aero Soln Inhale 1 puff and hold breath for 10 seconds then exhale.  Wait 1 full minute and repeat for second puff.  Use every 4-6 hours as needed. 1 each 0    Cholecalciferol (VITAMIN D3) 25 MCG (1000 UT) Oral Cap Take 1 tablet by mouth in the morning.      escitalopram 20 MG Oral Tab Take 1 tablet (20 mg total) by mouth daily. 90 tablet 0    acetaminophen 500 MG Oral Tab Take 1 tablet (500 mg total) by mouth as needed for Pain.      ibuprofen 600 MG Oral Tab Take 1 tablet (600 mg total) by mouth every 6 hours with food 20 tablet 0    Multiple Vitamin (DAILY MULTIVITAMIN OR) Take by mouth.      polyethylene glycol, PEG 3350, 17 g Oral Powd Pack Take 17 g by mouth daily. (Patient not taking: Reported on 7/15/2025)

## 2025-07-23 NOTE — PROGRESS NOTES
Established Patient Follow-up      7/23/2025    Toyin Barajas 55 year old      HPI  Chief Complaint   Patient presents with    Hemorrhoids     Pt is here for reoccurring hemorrhoids     Abdominal Pain     Pt states to have pain from lap cameron from 2 years ago       Patient returns for evaluation of external skin tags.  She had hemorrhoidectomy for a fibrotic hemorrhoid and a thrombosed hemorrhoid in the past.  No bleeding complaints.  Just wants to get things checked out.  She is having some gastro abdominal pain to the left of her umbilicus.  Pain is intermittent not related to meals or bowel movement.      Exam  Abdomen is soft, no hernia palpated.  Incisions are clean dry intact  Anal exam-digital anoscopic exam is performed.  Small external skin tags.  Grade 2 nonbleeding internal hemorrhoids      Assessment/Plan  Assessment   1. Other hemorrhoids        I suggest conservative treatment with high-fiber diet plenty of water and minimize toilet time for her hemorrhoids.  External tags may be removed if desired.  Banding of internal hemorrhoids if bleeding.  I suggest she keep her appointment with functional GI to discuss her left-sided abdominal pain       Sundeep Aviles MD

## (undated) NOTE — LETTER
Date & Time: 1/24/2021, 8:39 AM  Patient: Jaylene   Encounter Provider(s):    Mervin Hyatt       To Whom It May Concern:    Dorota Mckeon was seen and treated in our department on 1/24/2021. She may return to work on 1/26/2021.     If you

## (undated) NOTE — LETTER
11/12/2024          To Whom It May Concern:    Toyin Barajas is currently under my medical care. My patient is scheduled to have surgery on 11/18/2024. We are anticipating her return on 11/29/2024.     If you require additional information please contact our office.        Sincerely,      Pelon Gibbons MD          Document generated by:  Pelon Gibbons MD

## (undated) NOTE — LETTER
3/20/2023          To Whom It May Concern:    Yuan Daigle is currently under my medical care and had surgery on 3-. She may return to work on 4-3-2023. Please do not hesitate to call our office if you have any questions or concerns.        Sincerely,          Liza Dolan MD          Document generated by:  Reji Lovell

## (undated) NOTE — LETTER
Medical Clearance Request    Pelon Gibbons MD  [address] [place] [id number]  Via In Basket    The patient listed below is scheduled for surgery and needs the following pre-op labs and/or clearance prior to surgery.  The patient has been instructed to schedule an appointment with you for a pre-op physical.      Patient: Toyin Barajas  : 1970    Surgeon:  Demetrio Spence MD.    Procedure: Xi Robot-assisted, laparoscopic, possible open Cholecystectomy     Surgery Date:  24  Location:  [facility]    outpatient   Anesthesia: General    [] Hematocrit/Hemogram [x] EKG     [x] CBC    [] Chest X-Ray    [x] CMP   [] PT/PTT    [] Urinalysis   [] MRSA Nasal Culture    [] Urinalysis with reflex  [x] History and Physical    [] Urine pregnancy  [x] Medical Clearance    [] Qualitative HCG (blood) [] Cardiac Clearance      Please fax to [phone] when completed.  Call [phone] with any questions.     Thank you for your prompt attention to these requirements.    [facility]